# Patient Record
Sex: FEMALE | Race: BLACK OR AFRICAN AMERICAN | NOT HISPANIC OR LATINO | Employment: FULL TIME | URBAN - METROPOLITAN AREA
[De-identification: names, ages, dates, MRNs, and addresses within clinical notes are randomized per-mention and may not be internally consistent; named-entity substitution may affect disease eponyms.]

---

## 2021-11-30 LAB
ATRIAL RATE - MUSE: 105 BPM
DIASTOLIC BLOOD PRESSURE - MUSE: NORMAL MMHG
INTERPRETATION ECG - MUSE: NORMAL
P AXIS - MUSE: 51 DEGREES
PR INTERVAL - MUSE: 146 MS
QRS DURATION - MUSE: 78 MS
QT - MUSE: 352 MS
QTC - MUSE: 465 MS
R AXIS - MUSE: 4 DEGREES
SYSTOLIC BLOOD PRESSURE - MUSE: NORMAL MMHG
T AXIS - MUSE: 12 DEGREES
VENTRICULAR RATE- MUSE: 105 BPM

## 2021-11-30 PROCEDURE — 93005 ELECTROCARDIOGRAM TRACING: CPT | Mod: 76

## 2021-11-30 PROCEDURE — 93005 ELECTROCARDIOGRAM TRACING: CPT

## 2021-11-30 PROCEDURE — 99285 EMERGENCY DEPT VISIT HI MDM: CPT | Mod: 25

## 2021-11-30 ASSESSMENT — MIFFLIN-ST. JEOR: SCORE: 1744.15

## 2021-12-01 ENCOUNTER — APPOINTMENT (OUTPATIENT)
Dept: CT IMAGING | Facility: CLINIC | Age: 36
End: 2021-12-01
Attending: EMERGENCY MEDICINE
Payer: COMMERCIAL

## 2021-12-01 ENCOUNTER — HOSPITAL ENCOUNTER (EMERGENCY)
Facility: CLINIC | Age: 36
Discharge: HOME | End: 2021-12-01
Attending: EMERGENCY MEDICINE | Admitting: EMERGENCY MEDICINE
Payer: COMMERCIAL

## 2021-12-01 ENCOUNTER — HOSPITAL ENCOUNTER (INPATIENT)
Facility: CLINIC | Age: 36
LOS: 7 days | Discharge: HOME | End: 2021-12-08
Attending: EMERGENCY MEDICINE | Admitting: INTERNAL MEDICINE
Payer: COMMERCIAL

## 2021-12-01 VITALS
OXYGEN SATURATION: 98 % | BODY MASS INDEX: 38.32 KG/M2 | TEMPERATURE: 98.3 F | HEART RATE: 101 BPM | WEIGHT: 230 LBS | HEIGHT: 65 IN | SYSTOLIC BLOOD PRESSURE: 115 MMHG | RESPIRATION RATE: 20 BRPM | DIASTOLIC BLOOD PRESSURE: 61 MMHG

## 2021-12-01 DIAGNOSIS — U07.1 INFECTION DUE TO 2019 NOVEL CORONAVIRUS: ICD-10-CM

## 2021-12-01 DIAGNOSIS — J12.82 PNEUMONIA DUE TO 2019 NOVEL CORONAVIRUS: ICD-10-CM

## 2021-12-01 DIAGNOSIS — U07.1 PNEUMONIA DUE TO 2019 NOVEL CORONAVIRUS: ICD-10-CM

## 2021-12-01 DIAGNOSIS — R09.02 HYPOXIA: ICD-10-CM

## 2021-12-01 LAB
ALBUMIN SERPL-MCNC: 2.5 G/DL (ref 3.4–5)
ALBUMIN SERPL-MCNC: 2.6 G/DL (ref 3.4–5)
ALP SERPL-CCNC: 40 U/L (ref 40–150)
ALP SERPL-CCNC: 42 U/L (ref 40–150)
ALT SERPL W P-5'-P-CCNC: 22 U/L (ref 0–50)
ALT SERPL W P-5'-P-CCNC: 25 U/L (ref 0–50)
ANION GAP SERPL CALCULATED.3IONS-SCNC: 5 MMOL/L (ref 3–14)
ANION GAP SERPL CALCULATED.3IONS-SCNC: 6 MMOL/L (ref 3–14)
AST SERPL W P-5'-P-CCNC: 21 U/L (ref 0–45)
AST SERPL W P-5'-P-CCNC: 22 U/L (ref 0–45)
ATRIAL RATE - MUSE: 110 BPM
BASOPHILS # BLD AUTO: 0 10E3/UL (ref 0–0.2)
BASOPHILS # BLD AUTO: 0 10E3/UL (ref 0–0.2)
BASOPHILS NFR BLD AUTO: 0 %
BASOPHILS NFR BLD AUTO: 0 %
BILIRUB SERPL-MCNC: 0.4 MG/DL (ref 0.2–1.3)
BILIRUB SERPL-MCNC: 0.4 MG/DL (ref 0.2–1.3)
BUN SERPL-MCNC: 5 MG/DL (ref 7–30)
BUN SERPL-MCNC: 5 MG/DL (ref 7–30)
CALCIUM SERPL-MCNC: 7.9 MG/DL (ref 8.5–10.1)
CALCIUM SERPL-MCNC: 8 MG/DL (ref 8.5–10.1)
CHLORIDE BLD-SCNC: 101 MMOL/L (ref 94–109)
CHLORIDE BLD-SCNC: 104 MMOL/L (ref 94–109)
CO2 SERPL-SCNC: 25 MMOL/L (ref 20–32)
CO2 SERPL-SCNC: 26 MMOL/L (ref 20–32)
CREAT SERPL-MCNC: 0.72 MG/DL (ref 0.52–1.04)
CREAT SERPL-MCNC: 0.79 MG/DL (ref 0.52–1.04)
D DIMER PPP FEU-MCNC: 0.66 UG/ML FEU (ref 0–0.5)
DIASTOLIC BLOOD PRESSURE - MUSE: NORMAL MMHG
EOSINOPHIL # BLD AUTO: 0 10E3/UL (ref 0–0.7)
EOSINOPHIL # BLD AUTO: 0 10E3/UL (ref 0–0.7)
EOSINOPHIL NFR BLD AUTO: 0 %
EOSINOPHIL NFR BLD AUTO: 0 %
ERYTHROCYTE [DISTWIDTH] IN BLOOD BY AUTOMATED COUNT: 12.2 % (ref 10–15)
ERYTHROCYTE [DISTWIDTH] IN BLOOD BY AUTOMATED COUNT: 12.3 % (ref 10–15)
GFR SERPL CREATININE-BSD FRML MDRD: >90 ML/MIN/1.73M2
GFR SERPL CREATININE-BSD FRML MDRD: >90 ML/MIN/1.73M2
GLUCOSE BLD-MCNC: 115 MG/DL (ref 70–99)
GLUCOSE BLD-MCNC: 117 MG/DL (ref 70–99)
HCT VFR BLD AUTO: 36.3 % (ref 35–47)
HCT VFR BLD AUTO: 37.2 % (ref 35–47)
HGB BLD-MCNC: 11.9 G/DL (ref 11.7–15.7)
HGB BLD-MCNC: 11.9 G/DL (ref 11.7–15.7)
IMM GRANULOCYTES # BLD: 0 10E3/UL
IMM GRANULOCYTES # BLD: 0.1 10E3/UL
IMM GRANULOCYTES NFR BLD: 1 %
IMM GRANULOCYTES NFR BLD: 1 %
INTERPRETATION ECG - MUSE: NORMAL
LACTATE SERPL-SCNC: 1.5 MMOL/L (ref 0.7–2)
LYMPHOCYTES # BLD AUTO: 0.9 10E3/UL (ref 0.8–5.3)
LYMPHOCYTES # BLD AUTO: 1.3 10E3/UL (ref 0.8–5.3)
LYMPHOCYTES NFR BLD AUTO: 10 %
LYMPHOCYTES NFR BLD AUTO: 22 %
MCH RBC QN AUTO: 28.1 PG (ref 26.5–33)
MCH RBC QN AUTO: 28.7 PG (ref 26.5–33)
MCHC RBC AUTO-ENTMCNC: 32 G/DL (ref 31.5–36.5)
MCHC RBC AUTO-ENTMCNC: 32.8 G/DL (ref 31.5–36.5)
MCV RBC AUTO: 88 FL (ref 78–100)
MCV RBC AUTO: 88 FL (ref 78–100)
MONOCYTES # BLD AUTO: 0.3 10E3/UL (ref 0–1.3)
MONOCYTES # BLD AUTO: 0.4 10E3/UL (ref 0–1.3)
MONOCYTES NFR BLD AUTO: 3 %
MONOCYTES NFR BLD AUTO: 6 %
NEUTROPHILS # BLD AUTO: 4.3 10E3/UL (ref 1.6–8.3)
NEUTROPHILS # BLD AUTO: 7.2 10E3/UL (ref 1.6–8.3)
NEUTROPHILS NFR BLD AUTO: 71 %
NEUTROPHILS NFR BLD AUTO: 86 %
NRBC # BLD AUTO: 0 10E3/UL
NRBC # BLD AUTO: 0 10E3/UL
NRBC BLD AUTO-RTO: 0 /100
NRBC BLD AUTO-RTO: 0 /100
P AXIS - MUSE: 56 DEGREES
PLATELET # BLD AUTO: 191 10E3/UL (ref 150–450)
PLATELET # BLD AUTO: 213 10E3/UL (ref 150–450)
POTASSIUM BLD-SCNC: 3.6 MMOL/L (ref 3.4–5.3)
POTASSIUM BLD-SCNC: 4.2 MMOL/L (ref 3.4–5.3)
PR INTERVAL - MUSE: 166 MS
PROT SERPL-MCNC: 7.6 G/DL (ref 6.8–8.8)
PROT SERPL-MCNC: 7.8 G/DL (ref 6.8–8.8)
QRS DURATION - MUSE: 74 MS
QT - MUSE: 314 MS
QTC - MUSE: 424 MS
R AXIS - MUSE: 9 DEGREES
RBC # BLD AUTO: 4.15 10E6/UL (ref 3.8–5.2)
RBC # BLD AUTO: 4.24 10E6/UL (ref 3.8–5.2)
SODIUM SERPL-SCNC: 132 MMOL/L (ref 133–144)
SODIUM SERPL-SCNC: 135 MMOL/L (ref 133–144)
SYSTOLIC BLOOD PRESSURE - MUSE: NORMAL MMHG
T AXIS - MUSE: -6 DEGREES
TROPONIN I SERPL HS-MCNC: 4 NG/L
TROPONIN I SERPL HS-MCNC: 5 NG/L
TROPONIN I SERPL-MCNC: <0.015 UG/L (ref 0–0.04)
VENTRICULAR RATE- MUSE: 110 BPM
WBC # BLD AUTO: 6.1 10E3/UL (ref 4–11)
WBC # BLD AUTO: 8.4 10E3/UL (ref 4–11)

## 2021-12-01 PROCEDURE — 85379 FIBRIN DEGRADATION QUANT: CPT | Performed by: EMERGENCY MEDICINE

## 2021-12-01 PROCEDURE — 85025 COMPLETE CBC W/AUTO DIFF WBC: CPT | Performed by: EMERGENCY MEDICINE

## 2021-12-01 PROCEDURE — 99285 EMERGENCY DEPT VISIT HI MDM: CPT | Mod: 25

## 2021-12-01 PROCEDURE — 96361 HYDRATE IV INFUSION ADD-ON: CPT

## 2021-12-01 PROCEDURE — 36415 COLL VENOUS BLD VENIPUNCTURE: CPT | Performed by: EMERGENCY MEDICINE

## 2021-12-01 PROCEDURE — 99223 1ST HOSP IP/OBS HIGH 75: CPT | Mod: AI | Performed by: INTERNAL MEDICINE

## 2021-12-01 PROCEDURE — 258N000003 HC RX IP 258 OP 636: Performed by: EMERGENCY MEDICINE

## 2021-12-01 PROCEDURE — 250N000011 HC RX IP 250 OP 636: Performed by: EMERGENCY MEDICINE

## 2021-12-01 PROCEDURE — 84155 ASSAY OF PROTEIN SERUM: CPT | Performed by: EMERGENCY MEDICINE

## 2021-12-01 PROCEDURE — 84484 ASSAY OF TROPONIN QUANT: CPT | Performed by: EMERGENCY MEDICINE

## 2021-12-01 PROCEDURE — XW033E5 INTRODUCTION OF REMDESIVIR ANTI-INFECTIVE INTO PERIPHERAL VEIN, PERCUTANEOUS APPROACH, NEW TECHNOLOGY GROUP 5: ICD-10-PCS | Performed by: INTERNAL MEDICINE

## 2021-12-01 PROCEDURE — 83605 ASSAY OF LACTIC ACID: CPT | Performed by: EMERGENCY MEDICINE

## 2021-12-01 PROCEDURE — 250N000009 HC RX 250: Performed by: EMERGENCY MEDICINE

## 2021-12-01 PROCEDURE — 120N000001 HC R&B MED SURG/OB

## 2021-12-01 PROCEDURE — 96375 TX/PRO/DX INJ NEW DRUG ADDON: CPT

## 2021-12-01 PROCEDURE — 250N000011 HC RX IP 250 OP 636: Performed by: INTERNAL MEDICINE

## 2021-12-01 PROCEDURE — 96374 THER/PROPH/DIAG INJ IV PUSH: CPT | Mod: 59

## 2021-12-01 PROCEDURE — 250N000013 HC RX MED GY IP 250 OP 250 PS 637: Performed by: EMERGENCY MEDICINE

## 2021-12-01 PROCEDURE — 94640 AIRWAY INHALATION TREATMENT: CPT

## 2021-12-01 PROCEDURE — 71275 CT ANGIOGRAPHY CHEST: CPT

## 2021-12-01 PROCEDURE — 96374 THER/PROPH/DIAG INJ IV PUSH: CPT

## 2021-12-01 PROCEDURE — 82040 ASSAY OF SERUM ALBUMIN: CPT | Performed by: EMERGENCY MEDICINE

## 2021-12-01 RX ORDER — ALBUTEROL SULFATE 90 UG/1
2 AEROSOL, METERED RESPIRATORY (INHALATION) EVERY 6 HOURS PRN
Qty: 6.7 G | Refills: 0 | Status: SHIPPED | OUTPATIENT
Start: 2021-12-01

## 2021-12-01 RX ORDER — ONDANSETRON 2 MG/ML
4 INJECTION INTRAMUSCULAR; INTRAVENOUS EVERY 6 HOURS PRN
Status: DISCONTINUED | OUTPATIENT
Start: 2021-12-01 | End: 2021-12-08 | Stop reason: HOSPADM

## 2021-12-01 RX ORDER — ACETAMINOPHEN 500 MG
1000 TABLET ORAL ONCE
Status: COMPLETED | OUTPATIENT
Start: 2021-12-01 | End: 2021-12-01

## 2021-12-01 RX ORDER — DOXYCYCLINE 100 MG/1
100 CAPSULE ORAL 2 TIMES DAILY
Qty: 14 CAPSULE | Refills: 0 | Status: ON HOLD | OUTPATIENT
Start: 2021-12-01 | End: 2021-12-08

## 2021-12-01 RX ORDER — ONDANSETRON 2 MG/ML
4 INJECTION INTRAMUSCULAR; INTRAVENOUS EVERY 30 MIN PRN
Status: DISCONTINUED | OUTPATIENT
Start: 2021-12-01 | End: 2021-12-01 | Stop reason: HOSPADM

## 2021-12-01 RX ORDER — DEXAMETHASONE SODIUM PHOSPHATE 4 MG/ML
6 INJECTION, SOLUTION INTRA-ARTICULAR; INTRALESIONAL; INTRAMUSCULAR; INTRAVENOUS; SOFT TISSUE ONCE
Status: COMPLETED | OUTPATIENT
Start: 2021-12-01 | End: 2021-12-01

## 2021-12-01 RX ORDER — IBUPROFEN 600 MG/1
600 TABLET, FILM COATED ORAL ONCE
Status: COMPLETED | OUTPATIENT
Start: 2021-12-01 | End: 2021-12-01

## 2021-12-01 RX ORDER — ONDANSETRON 4 MG/1
4 TABLET, ORALLY DISINTEGRATING ORAL EVERY 6 HOURS PRN
Status: DISCONTINUED | OUTPATIENT
Start: 2021-12-01 | End: 2021-12-08 | Stop reason: HOSPADM

## 2021-12-01 RX ORDER — ACETAMINOPHEN 500 MG
1000 TABLET ORAL ONCE
Status: DISCONTINUED | OUTPATIENT
Start: 2021-12-01 | End: 2021-12-01

## 2021-12-01 RX ORDER — ALBUTEROL SULFATE 90 UG/1
2 AEROSOL, METERED RESPIRATORY (INHALATION) ONCE
Status: COMPLETED | OUTPATIENT
Start: 2021-12-01 | End: 2021-12-01

## 2021-12-01 RX ORDER — IOPAMIDOL 755 MG/ML
79 INJECTION, SOLUTION INTRAVASCULAR ONCE
Status: COMPLETED | OUTPATIENT
Start: 2021-12-01 | End: 2021-12-01

## 2021-12-01 RX ADMIN — IBUPROFEN 600 MG: 600 TABLET ORAL at 18:24

## 2021-12-01 RX ADMIN — IBUPROFEN 600 MG: 600 TABLET ORAL at 02:16

## 2021-12-01 RX ADMIN — ACETAMINOPHEN 1000 MG: 500 TABLET, FILM COATED ORAL at 02:16

## 2021-12-01 RX ADMIN — IOPAMIDOL 79 ML: 755 INJECTION, SOLUTION INTRAVENOUS at 03:23

## 2021-12-01 RX ADMIN — SODIUM CHLORIDE 1000 ML: 9 INJECTION, SOLUTION INTRAVENOUS at 02:59

## 2021-12-01 RX ADMIN — SODIUM CHLORIDE 100 ML: 900 INJECTION INTRAVENOUS at 03:24

## 2021-12-01 RX ADMIN — ONDANSETRON 4 MG: 2 INJECTION INTRAMUSCULAR; INTRAVENOUS at 02:16

## 2021-12-01 RX ADMIN — SODIUM CHLORIDE 1000 ML: 9 INJECTION, SOLUTION INTRAVENOUS at 02:16

## 2021-12-01 RX ADMIN — ALBUTEROL SULFATE 2 PUFF: 90 AEROSOL, METERED RESPIRATORY (INHALATION) at 05:20

## 2021-12-01 RX ADMIN — ONDANSETRON 4 MG: 2 INJECTION INTRAMUSCULAR; INTRAVENOUS at 19:29

## 2021-12-01 RX ADMIN — DEXAMETHASONE SODIUM PHOSPHATE 6 MG: 4 INJECTION, SOLUTION INTRAMUSCULAR; INTRAVENOUS at 19:19

## 2021-12-01 ASSESSMENT — ACTIVITIES OF DAILY LIVING (ADL)
ADLS_ACUITY_SCORE: 7

## 2021-12-01 ASSESSMENT — ENCOUNTER SYMPTOMS
COUGH: 1
NAUSEA: 1
HEADACHES: 1
EYE PAIN: 1
VOMITING: 1
CHEST TIGHTNESS: 1
SHORTNESS OF BREATH: 1

## 2021-12-01 NOTE — DISCHARGE INSTRUCTIONS
Return if your oxygen saturation drops below 90% and persist there and does not come up with resting.    Discharge Instructions  COVID-19    COVID-19 is the disease caused by a new coronavirus. The virus spreads from person-to-person primarily by droplets when an infected person coughs or sneezes and the droplets are then breathed in by another person. There are tests available to diagnose COVID-19. You may have been diagnosed with COVID, may be being tested for COVID and have a pending test result, or may have been exposed to COVID.    Symptoms of COVID-19  Many people have no symptoms or mild symptoms.  Symptoms may usually appear 4 to 5 days (up to 14 days) after contact with a person with COVID-19. Some people will get severe symptoms and pneumonia. Usual symptoms are:     ? Fever  ? Cough  ? Trouble breathing    Less common symptoms are: Headache, body aches, sore throat, sneezing, diarrhea, loss of taste or smell.    Isolation and Quarantine    You may have been seen because you have symptoms, had an exposure, or had some other concern about possible COVID. The best way to stop the spread of the virus is to avoid contact with others.    Isolation refers to sick people staying away from people who are not sick. A person in quarantine is limiting activity because they were exposed and are waiting to see if they might become sick.    If you test positive for COVID, you should stay home (isolation) for at least 10 days after your symptoms began, and for 24 hours with no fever and improvement of symptoms--whichever is longer. (Your fever should be gone for 24 hours without using fever-reducing medicine). If you have no symptoms, you should stay home (isolation) for 10 days from the day of the test. If you have been vaccinated for COVID, the vaccination will not cause you to test positive so a positive test result generally is a  true positive .    For example, if you have a fever and cough for 6 days, you need to  stay home 4 more days with no fever for a total of 10 days. Or, if you have a fever and cough for 10 days, you need to stay home one more day with no fever for a total of 11 days.    If you have a high-risk exposure to COVID (you spent 15 minutes or more within six feet of somebody who has COVID), you should stay home (quarantine) for 14 days, unless you are vaccinated. Even if you test negative for COVID, the CDC recommends a 14-day quarantine from the time of your last exposure to that individual (unless you are vaccinated). There are options for a shortened (<14 day quarantine) you can review at:  [email or web address]#long    If you live in the same house as somebody with COVID and cannot separate from them, you will need to quarantine for 14-days after that person's isolation (infectious) period. That means that you may need to quarantine for 24-days after that person became symptomatic/ill.    If you are vaccinated and do not develop symptoms, you do not need to quarantine after exposure.    If you have symptoms but a negative test, you should stay at home until you are symptom-free and without fever for 24 hours, using the same judgment you would for when it is safe to return to work/school from strep throat, influenza, or the common cold. If you worsen, you should consider being re-evaluated.    If you are being tested for COVID because of symptoms and your test is pending, you should stay home until you know your test result.    If I have COVID, how should I protect myself and others?    Do not go to work or school. Have a friend or relative do your shopping. Do not use public transportation (bus, train) or ridesharing (Lyft, Uber).    Separate yourself from other people in your home. As much as possible, you should stay in one room and away from other people in your home. Also, use a separate bathroom, if possible. Avoid handling pets or other animals while sick.      Wear a facemask if you need to be around other people and cover your mouth and nose with a tissue when you cough or sneeze.     Avoid sharing personal household items. You should not share dishes, drinking glasses, forks/knives/spoons, towels, or bedding with other people in your home. After using these items, they should be washed with soap and water. Clean parts of your home that are touched often (doorknobs, faucets, countertops, etc.) daily.     Wash your hands often with soap and water for at least 20 seconds or use an alcohol-based hand  containing at least 60% alcohol.     Avoid touching your face.    Treat your symptoms. You can take Acetaminophen (Tylenol) to treat body aches and fever as needed for comfort. Ibuprofen (Advil or Motrin) can be used as well if you still have symptoms after taking Tylenol. Drink fluids. Rest.    Watch for worsening symptoms such as shortness of breath/difficulty breathing or very severe weakness.    Employers/workplaces are being asked by the Centers for Disease Control (CDC) to not request notes/documentation for you to return to work or prove that you were ill. You may choose to show your employer this paperwork. Also, repeat testing should not be required to return to work.    Exercise/Sports in rare cases, COVID could affect your heart in a way that makes exercise or participation in sports dangerous.    If you have a mild COVID illness (fever, cough, sore throat, and similar symptoms but no difficulty breathing or abnormalities of the lung): After your COVID symptoms have resolved, wait 14-days before returning to activity.  If you have more than a mild illness (meaning that you have problems with your breathing or lungs) or if you participate in competitive or strenuous activity or have a history of heart disease: Please see your primary doctor/provider prior to return to activity/competition.    Antibody treatments are available for patients with mild to  moderate COVID illness in order to prevent severe illness. In general, only patients with risk factors for severe illness are eligible for treatment. For more information, to see if you are eligible, and to find treatment, go to the [facility]:  [email or web address]     Return to the Emergency Department if:    If you are developing worsening breathing, shortness of breath, or feel worse you should seek medical attention.  If you are uncertain, contact your health care provider/clinic. If you need emergency medical attention, call 911 and tell them you have been ill.

## 2021-12-01 NOTE — ED PROVIDER NOTES
"  History   Chief Complaint:  Shortness of Breath and Covid Concern       The history is provided by the patient.      Kim Guillory is a 36 year old female who presents with headache, burning eye pain bilaterally, chest tightness and cough, loss of taste and smell, nausea, vomiting, diarrhea.  Symptoms started with a mild cough last Thursday (5 days ago).  Since then she has developed fevers and the other symptoms consistent with COVID-19.  She tested positive for COVID-19 on Saturday.  Patient did not receive COVID-19 vaccination.  She denies sick contacts.  Patient states she has been unable to eat due to nausea and persistent vomiting.  Her last Tylenol dose was at 2 PM    Review of Systems  Constitutional: Fevers and chills  Neurologic: Headache, no weakness or numbness  GI: Nausea, vomiting, diarrhea  Respiratory: Cough and shortness of breath  Cardiovascular: Chest pain  Remainder of systems reviewed and negative    Allergies:  No Known Allergies    Medications:  Mirena IUD    Past Medical History:     Vitamin D deficiency     Past Surgical History:     section    Family History:    Uterine cancer  Prostate cancer    Social History:  Presents with family  PCP: No Ref-Primary, Physician     Physical Exam     Patient Vitals for the past 24 hrs:   BP Temp Temp src Pulse Resp SpO2 Height Weight   21 0300 104/48 -- -- 99 -- 96 % -- --   21 0230 114/67 -- -- 101 -- 95 % -- --   21 0038 -- (!) 100.5  F (38.1  C) Oral -- -- -- -- --   21 0030 127/82 -- -- -- -- -- -- --   21 1800 (!) 151/89 99.2  F (37.3  C) -- 110 24 99 % 1.651 m (5' 5\") 104.3 kg (230 lb)       Physical Exam  General: Patient is alert and unwell appearing.  HEENT: Head atraumatic    Eyes: pupils equal and reactive. Conjunctiva clear   Nares: patent   Oropharynx: no lesions, uvula midline, no palatal draping, normal voice, no trismus  Neck: Supple without lymphadenopathy, no meningismus  Chest: Tachycardic but " regular  Lungs: Equal clear to auscultation with no wheeze or rales  Abdomen: Soft, non tender, nondistended, normal bowel sounds  Back: No costovertebral angle tenderness, no midline C, T or L spine tenderness  Neuro: Grossly nonfocal, normal speech, strength equal bilaterally, CN 2-12 intact  Extremities: No deformities, equal radial and DP pulses. No clubbing, cyanosis.  No edema  Skin: Warm and dry with no rash.       Emergency Department Course   ECG  ECG obtained at 0041, ECG read at 0044  Sinus tachycardia. Nonspecific T wave abnormality. Abnormal ECG.    No prior ECG for comparison.   Rate 110 bpm. WI interval 166 ms. QRS duration 74 ms. QT/QTc 314/424 ms. P-R-T axes 56 9 -6.     Imaging:  CT Chest Pulmonary Embolism w Contrast   Final Result   IMPRESSION:   1.  Multifocal, bilateral infiltrates which can be seen with COVID pneumonia.   2.  No pulmonary embolism.        Report per radiology    Laboratory:  Labs Ordered and Resulted from Time of ED Arrival to Time of ED Departure   COMPREHENSIVE METABOLIC PANEL - Abnormal       Result Value    Sodium 132 (*)     Potassium 4.2      Chloride 101      Carbon Dioxide (CO2) 26      Anion Gap 5      Urea Nitrogen 5 (*)     Creatinine 0.79      Calcium 8.0 (*)     Glucose 117 (*)     Alkaline Phosphatase 42      AST 22      ALT 25      Protein Total 7.8      Albumin 2.6 (*)     Bilirubin Total 0.4      GFR Estimate >90     D DIMER QUANTITATIVE - Abnormal    D-Dimer Quantitative 0.66 (*)    LACTIC ACID WHOLE BLOOD - Normal    Lactic Acid 1.5     TROPONIN I - Normal    Troponin I High Sensitivity 5     CBC WITH PLATELETS AND DIFFERENTIAL    WBC Count 6.1      RBC Count 4.15      Hemoglobin 11.9      Hematocrit 36.3      MCV 88      MCH 28.7      MCHC 32.8      RDW 12.2      Platelet Count 191      % Neutrophils 71      % Lymphocytes 22      % Monocytes 6      % Eosinophils 0      % Basophils 0      % Immature Granulocytes 1      NRBCs per 100 WBC 0      Absolute  Neutrophils 4.3      Absolute Lymphocytes 1.3      Absolute Monocytes 0.4      Absolute Eosinophils 0.0      Absolute Basophils 0.0      Absolute Immature Granulocytes 0.0      Absolute NRBCs 0.0     TROPONIN I        Emergency Department Course:  Reviewed:  I reviewed nursing notes, vitals, past medical history, Care Everywhere and [facility]    Assessments:  0035 I obtained history and examined the patient as noted above.   0440 I rechecked the patient and explained findings.     Interventions:  0216 0.9% sodium chloride bolus, 1,000 ml, IV  0216 Tylenol, 1,000 mg, PO  0216 Ibuprofen, 600 mg, PO  0216 Zofran, 4 mg, IV  0259 0.9% sodium chloride bolus, 1,000 ml, IV    Disposition:  The patient was discharged to home.     Impression & Plan     Medical Decision Making:  Patient is a 36-year-old female with history of headache, chest tightness, cough, loss of taste and smell as well as nausea, vomiting, diarrhea consistent with COVID-19 infection.  Work-up was undertaken as noted above.  Thankfully no evidence for pulmonary embolism or myocarditis.  There is evidence for pneumonia.  Patient's oxygen saturation and ambulatory sats stays above 94%.  Patient feels improved after fluid hydration here in the emergency department.  She will be given an albuterol inhaler for home.  In addition pulse oximeter will be given for home as well.  This is likely viral pneumonia from COVID-19 but possibility for bacterial superinfection exists with her continued fevers.  She will be given a prescription for doxycycline in the event that there is bacterial superinfection however she understands this is likely viral and this will not improve her symptoms significantly.  Recommend continued fluid hydration at home.  Tylenol and Motrin for the her fever.  Return precautions related to her respiratory status and oxygen saturation were discussed.  Patient understands need to continue to isolate.  At this time patient is appropriate and  clinically appropriate for discharge.  She understands she may get worse before she gets better at which point she will need to return to the emergency department.  Patient's questions and concerns were addressed.      Covid-19  Kim Guillory was evaluated during a global COVID-19 pandemic, which necessitated consideration that the patient might be at risk for infection with the SARS-CoV-2 virus that causes COVID-19.   Applicable protocols for evaluation were followed during the patient's care.   COVID-19 was considered as part of the patient's evaluation. The plan for testing is:  a test was obtained at a previous visit and reviewed & considered today.      Diagnosis:    ICD-10-CM    1. Pneumonia due to 2019 novel coronavirus  U07.1     J12.82        Discharge Medications:  New Prescriptions    ALBUTEROL (PROAIR HFA/PROVENTIL HFA/VENTOLIN HFA) 108 (90 BASE) MCG/ACT INHALER    Inhale 2 puffs into the lungs every 6 hours as needed for shortness of breath / dyspnea or wheezing    DOXYCYCLINE HYCLATE (VIBRAMYCIN) 100 MG CAPSULE    Take 1 capsule (100 mg) by mouth 2 times daily for 7 days       Scribe Disclosure:  Maryanne ELAINE, am serving as a scribe at 12:10 AM on 12/1/2021 to document services personally performed by Vane Weiss MD based on my observations and the provider's statements to me.              Vane Weiss MD  12/01/21 0507

## 2021-12-01 NOTE — ED NOTES
md in to f/u. Pt dcd with rx, teaching, and instructions. Pt dcd with portable finger sat monitor, albuterol inhaler and spacer. All questions answered.

## 2021-12-02 LAB
ABO/RH(D): NORMAL
ANION GAP SERPL CALCULATED.3IONS-SCNC: 4 MMOL/L (ref 3–14)
BUN SERPL-MCNC: 6 MG/DL (ref 7–30)
CALCIUM SERPL-MCNC: 8.6 MG/DL (ref 8.5–10.1)
CHLORIDE BLD-SCNC: 107 MMOL/L (ref 94–109)
CO2 SERPL-SCNC: 28 MMOL/L (ref 20–32)
CREAT SERPL-MCNC: 0.69 MG/DL (ref 0.52–1.04)
CRP SERPL-MCNC: 212 MG/L (ref 0–8)
D DIMER PPP FEU-MCNC: 0.71 UG/ML FEU (ref 0–0.5)
ERYTHROCYTE [DISTWIDTH] IN BLOOD BY AUTOMATED COUNT: 12.4 % (ref 10–15)
FIBRINOGEN PPP-MCNC: 802 MG/DL (ref 170–490)
GFR SERPL CREATININE-BSD FRML MDRD: >90 ML/MIN/1.73M2
GLUCOSE BLD-MCNC: 170 MG/DL (ref 70–99)
HCT VFR BLD AUTO: 36.7 % (ref 35–47)
HGB BLD-MCNC: 11.9 G/DL (ref 11.7–15.7)
LDH SERPL L TO P-CCNC: 342 U/L (ref 81–234)
MCH RBC QN AUTO: 28.3 PG (ref 26.5–33)
MCHC RBC AUTO-ENTMCNC: 32.4 G/DL (ref 31.5–36.5)
MCV RBC AUTO: 87 FL (ref 78–100)
PLATELET # BLD AUTO: 215 10E3/UL (ref 150–450)
POTASSIUM BLD-SCNC: 4.2 MMOL/L (ref 3.4–5.3)
RBC # BLD AUTO: 4.2 10E6/UL (ref 3.8–5.2)
SODIUM SERPL-SCNC: 139 MMOL/L (ref 133–144)
SPECIMEN EXPIRATION DATE: NORMAL
WBC # BLD AUTO: 4.9 10E3/UL (ref 4–11)

## 2021-12-02 PROCEDURE — 120N000001 HC R&B MED SURG/OB

## 2021-12-02 PROCEDURE — 85384 FIBRINOGEN ACTIVITY: CPT | Performed by: INTERNAL MEDICINE

## 2021-12-02 PROCEDURE — 250N000013 HC RX MED GY IP 250 OP 250 PS 637: Performed by: STUDENT IN AN ORGANIZED HEALTH CARE EDUCATION/TRAINING PROGRAM

## 2021-12-02 PROCEDURE — 258N000003 HC RX IP 258 OP 636: Performed by: INTERNAL MEDICINE

## 2021-12-02 PROCEDURE — 99233 SBSQ HOSP IP/OBS HIGH 50: CPT | Performed by: STUDENT IN AN ORGANIZED HEALTH CARE EDUCATION/TRAINING PROGRAM

## 2021-12-02 PROCEDURE — 86900 BLOOD TYPING SEROLOGIC ABO: CPT | Performed by: INTERNAL MEDICINE

## 2021-12-02 PROCEDURE — 36415 COLL VENOUS BLD VENIPUNCTURE: CPT | Performed by: INTERNAL MEDICINE

## 2021-12-02 PROCEDURE — 250N000013 HC RX MED GY IP 250 OP 250 PS 637: Performed by: INTERNAL MEDICINE

## 2021-12-02 PROCEDURE — 85379 FIBRIN DEGRADATION QUANT: CPT | Performed by: INTERNAL MEDICINE

## 2021-12-02 PROCEDURE — 86140 C-REACTIVE PROTEIN: CPT | Performed by: INTERNAL MEDICINE

## 2021-12-02 PROCEDURE — 250N000012 HC RX MED GY IP 250 OP 636 PS 637: Performed by: INTERNAL MEDICINE

## 2021-12-02 PROCEDURE — 250N000011 HC RX IP 250 OP 636: Performed by: INTERNAL MEDICINE

## 2021-12-02 PROCEDURE — 83615 LACTATE (LD) (LDH) ENZYME: CPT | Performed by: INTERNAL MEDICINE

## 2021-12-02 PROCEDURE — 85027 COMPLETE CBC AUTOMATED: CPT | Performed by: INTERNAL MEDICINE

## 2021-12-02 PROCEDURE — 250N000009 HC RX 250: Performed by: INTERNAL MEDICINE

## 2021-12-02 PROCEDURE — 80048 BASIC METABOLIC PNL TOTAL CA: CPT | Performed by: INTERNAL MEDICINE

## 2021-12-02 RX ORDER — ACETAMINOPHEN 325 MG/1
650 TABLET ORAL EVERY 6 HOURS PRN
Status: DISCONTINUED | OUTPATIENT
Start: 2021-12-02 | End: 2021-12-08 | Stop reason: HOSPADM

## 2021-12-02 RX ORDER — LIDOCAINE 40 MG/G
CREAM TOPICAL
Status: DISCONTINUED | OUTPATIENT
Start: 2021-12-02 | End: 2021-12-08 | Stop reason: HOSPADM

## 2021-12-02 RX ORDER — ACETAMINOPHEN 650 MG/1
650 SUPPOSITORY RECTAL EVERY 6 HOURS PRN
Status: DISCONTINUED | OUTPATIENT
Start: 2021-12-02 | End: 2021-12-08 | Stop reason: HOSPADM

## 2021-12-02 RX ORDER — ALBUTEROL SULFATE 90 UG/1
2 AEROSOL, METERED RESPIRATORY (INHALATION) EVERY 4 HOURS PRN
Status: DISCONTINUED | OUTPATIENT
Start: 2021-12-02 | End: 2021-12-08 | Stop reason: HOSPADM

## 2021-12-02 RX ORDER — AMOXICILLIN 250 MG
2 CAPSULE ORAL 2 TIMES DAILY PRN
Status: DISCONTINUED | OUTPATIENT
Start: 2021-12-02 | End: 2021-12-08 | Stop reason: HOSPADM

## 2021-12-02 RX ORDER — AMOXICILLIN 250 MG
1 CAPSULE ORAL 2 TIMES DAILY PRN
Status: DISCONTINUED | OUTPATIENT
Start: 2021-12-02 | End: 2021-12-08 | Stop reason: HOSPADM

## 2021-12-02 RX ADMIN — ENOXAPARIN SODIUM 40 MG: 40 INJECTION SUBCUTANEOUS at 21:12

## 2021-12-02 RX ADMIN — REMDESIVIR 100 MG: 100 INJECTION, POWDER, LYOPHILIZED, FOR SOLUTION INTRAVENOUS at 20:59

## 2021-12-02 RX ADMIN — GUAIFENESIN 10 ML: 200 SOLUTION ORAL at 16:43

## 2021-12-02 RX ADMIN — SODIUM CHLORIDE 50 ML: 9 INJECTION, SOLUTION INTRAVENOUS at 22:10

## 2021-12-02 RX ADMIN — ENOXAPARIN SODIUM 40 MG: 40 INJECTION SUBCUTANEOUS at 08:51

## 2021-12-02 RX ADMIN — Medication 1 LOZENGE: at 16:43

## 2021-12-02 RX ADMIN — DEXAMETHASONE 6 MG: 2 TABLET ORAL at 13:48

## 2021-12-02 RX ADMIN — REMDESIVIR 200 MG: 100 INJECTION, POWDER, LYOPHILIZED, FOR SOLUTION INTRAVENOUS at 02:25

## 2021-12-02 RX ADMIN — ACETAMINOPHEN 650 MG: 325 TABLET, FILM COATED ORAL at 13:49

## 2021-12-02 RX ADMIN — SODIUM CHLORIDE 50 ML: 9 INJECTION, SOLUTION INTRAVENOUS at 02:34

## 2021-12-02 ASSESSMENT — ACTIVITIES OF DAILY LIVING (ADL)
ADLS_ACUITY_SCORE: 9
ADLS_ACUITY_SCORE: 7
ADLS_ACUITY_SCORE: 9
ADLS_ACUITY_SCORE: 7
ADLS_ACUITY_SCORE: 9

## 2021-12-02 ASSESSMENT — MIFFLIN-ST. JEOR: SCORE: 1830.88

## 2021-12-02 NOTE — PLAN OF CARE
Summary:  COVID+   DATE & TIME: 12/02/21 [id number]  Cognitive Concerns/ Orientation : A&O x4   BEHAVIOR & AGGRESSION TOOL COLOR: Green  CIWA SCORE: NA   ABNL VS/O2: VSS 1 L NC  MOBILITY: IND, calls if needs assistance  PAIN MANAGMENT: Denies  DIET: Regular  BOWEL/BLADDER: Continent to BR  ABNL LAB/BG: , Alb 2.5, d-dimer 0.66  DRAIN/DEVICES: PIV R arm saline locked  TELEMETRY RHYTHM: NA  SKIN: WNL  TESTS/PROCEDURES: None  D/C DAY/GOALS/PLACE: Discharge pending clinical improvement  OTHER IMPORTANT INFO: Special precautions maintained for COVID +; QUILES; frequent productive cough, LS diminished.  Started on Remdesivir and Decadron.

## 2021-12-02 NOTE — H&P
[facility]    History and Physical - Hospitalist Service       Date of Admission:  12/1/2021    Assessment & Plan      Kim Guillory is a 36 year old unvaccinated female with no other significant past medical history admitted on 12/1/2021 with COVID-19 pneumonia    COVID-19 Diagnosis Details:  Onset of COVID symptoms 5 days ago   Date of positive test results 12/1   Research study No     # Confirmed COVID-19 infection    # Acute Hypoxic Respiratory Failure secondary to COVID-19 infection  - Initial chest CT without contrast showed multifocal airspace disease. Oxygen needs have been slightly worsened.   - Admit to medical floor with continuous pulse oximetry, COVID-19 special precautions, minimized lab draws, and care consolidation  - Oxygen: continue current support with nasal cannula at 2 L/min; titrate to keep SpO2 between 90-96%  - Labs: labs notable for Sodium 132, albumin 2.6, D-dimer 0.6 done on admission and reviewed by me. Will trend until patient reaches clinical stability.  - Imaging: Had chest CT to rule out PE last night  - Breathing treatments: albuterol inhaler four times a day scheduled and PRN; avoid nebulizers in favor of MDIs   - IV fluids: not indicated at this time  - Antibiotics: not indicated   - Treatments: Dexamethasone 6 mg x 10 days or until hospital discharge, started on 12/1 and Remdesivir x 5 days or until hospital discharge, started on 12/1  - Research study protocol: Now  - DVT Prophylaxis: At high risk of thrombotic complications due to COVID-19 disease         - PROPHYLACTIC dosing: lovenox 40mg daily  - Discharge Anticoagulation: At discharge consider one of the following for 30 days and until the patient has returned to normal mobility:        - Apixaban (Eliquis) 2.5 mg two times a day        - Rivaroxaban (Xarelto) 10 mg once daily    Diet:  Regular  DVT Prophylaxis: Enoxaparin (Lovenox) SQ  Rivera Catheter: Not present  Central Lines: None  Code  Status:  Full    Clinically Significant Risk Factors Present on Admission         # Hyponatremia: Na = 132 mmol/L (Ref range: 133 - 144 mmol/L) on admission, will monitor as appropriate           Disposition Plan   Expected discharge:  2-3 days recommended to prior living arrangement once O2 use less than 0 liters/minute.     The patient's care was discussed with the Bedside Nurse, Patient and ED Team.    Elmo Horn MD, MD  [facility]  Securely message with the Vocera Web Console (learn more here)  Text page via Organizer Paging/Directory    ______________________________________________________________________    Chief Complaint   Worsening shortness of breath    History is obtained from the patient and the ED staff    History of Present Illness   Kim Guillory is a 36 year old unvaccinated female with no other significant past medical history admitted on 12/1/2021 with COVID-19 pneumonia  As per her she started having headache, burning eye pain with cough, loss of taste and smell, nausea, vomiting, diarrhea around last Thursday.  Had positive test on Saturday and has had fevers.  Last night she presented to the ED due to inability to eat and fevers.  She had CT chest to rule out PE (no PE but groundglass opacities consistent with COVID-19 pneumonia) .  She was discharged home with albuterol and doxycycline  She presented again in the afternoon with worsening shortness of breath and desaturating with minimal exertion.     She continues to have nausea, vomiting, diarrhea.  Had 5 episodes of this morning.  Worsening shortness of breath  Denies any chest pain/palpitations    Review of Systems    The 10 point Review of Systems is negative other than noted in the HPI or here.     Past Medical History    I have reviewed this patient's medical history and updated it with pertinent information if needed.   No past medical history on file.    Past Surgical History   I have reviewed this  patient's surgical history and updated it with pertinent information if needed.  No past surgical history on file.    Social History   I have reviewed this patient's social history and updated it with pertinent information if needed.  Social History     Tobacco Use     Smoking status: Not on file     Smokeless tobacco: Not on file   Substance Use Topics     Alcohol use: Not on file     Drug use: Not on file       Family History     Reviewed not pertinent to current admission: Multiple members of breast cancer    Prior to Admission Medications   Prior to Admission Medications   Prescriptions Last Dose Informant Patient Reported? Taking?   albuterol (PROAIR HFA/PROVENTIL HFA/VENTOLIN HFA) 108 (90 Base) MCG/ACT inhaler   No No   Sig: Inhale 2 puffs into the lungs every 6 hours as needed for shortness of breath / dyspnea or wheezing   doxycycline hyclate (VIBRAMYCIN) 100 MG capsule   No No   Sig: Take 1 capsule (100 mg) by mouth 2 times daily for 7 days      Facility-Administered Medications: None     Allergies   No Known Allergies    Physical Exam   Vital Signs: Temp: 100.4  F (38  C)   BP: (!) 142/87 Pulse: 114   Resp: (!) 42 SpO2: 95 % O2 Device: Nasal cannula    Weight: 230 lbs 0 oz    /78   Pulse 116   Temp (!) 102.9  F (39.4  C) (Oral)   Resp (!) 36   Wt 104.3 kg (230 lb)   SpO2 98%   BMI 38.27 kg/m    Gen- pleasant  lying in bed  HEENT- NAD, ANNE  Neck- supple, no JVD elevation, no thyromegaly  CVS- I+II+ no m/r/g  RS- CTAB, no gross wheeze.  Abdo- soft, no tenderness . No g/r/r   Ext- no edema   CNS- no gross focal deficit    Data   Data reviewed today: I reviewed all medications, new labs and imaging results over the last 24 hours. I personally reviewed the chest CT image(s) showing as below.    8.4    \    11.9    /    213   N 86    L N/A    132 (L)    101    5 (L) /   ------------------------------------ 117 (H)   ALT 25   AST 22   AP 42   ALB 2.6 (L)   Ca 8.0 (L)  4.2    26    0.79 \    %  RETIC N/A    LDH N/A  Troponin <0.015    BNP N/A    CK N/A  INR N/A   PTT N/A    D-dimer 0.66 (H)    Fibrinogen N/A    Antithrombin N/A  Ferritin N/A  CRP N/A    IL-6 N/A  Recent Results (from the past 24 hour(s))   CT Chest Pulmonary Embolism w Contrast    Narrative    EXAM: CT CHEST PULMONARY EMBOLISM W CONTRAST  LOCATION: [facility]  DATE/TIME: 12/1/2021 3:35 AM    INDICATION: PE suspected, low/intermediate prob, positive D-dimer. COVID 19.  COMPARISON: None.  TECHNIQUE: CT chest pulmonary angiogram during arterial phase injection of IV contrast. Multiplanar reformats and MIP reconstructions were performed. Dose reduction techniques were used.   CONTRAST: 79mL [id number]-370    FINDINGS:  ANGIOGRAM CHEST: Pulmonary arteries are normal caliber and negative for pulmonary emboli. Thoracic aorta is negative for dissection. No CT evidence of right heart strain.    LUNGS AND PLEURA: Multifocal, bilateral infiltrates with subpleural predominance.    MEDIASTINUM/AXILLAE: Subcentimeter mediastinal lymph nodes. No pericardial effusion. Moderate hiatal hernia.    CORONARY ARTERY CALCIFICATION: None visualized.    UPPER ABDOMEN: Normal.    MUSCULOSKELETAL: Normal.      Impression    IMPRESSION:  1.  Multifocal, bilateral infiltrates which can be seen with COVID pneumonia.  2.  No pulmonary embolism.

## 2021-12-02 NOTE — ED NOTES
RN not releasing pt for CXR due to pt having a chest CT performed at 0330. Dr. Hernandez states CXR does not need to be performed, however, we are unable to cancel it

## 2021-12-02 NOTE — PLAN OF CARE
RECEIVING UNIT ED HANDOFF REVIEW    ED Nurse Handoff Report was reviewed by: Delmi Arnold RN on December 1, 2021 at 11:29 PM

## 2021-12-02 NOTE — ED NOTES
Received report for continuation of care.  Patient resting, readjusted in bed. Remains tachypneic, is on 2L NC. Endorses nausea, medicated per MAR. Provided with a meal as well.  Is febrile, but not due for antipyretics yet.

## 2021-12-02 NOTE — ED PROVIDER NOTES
History   Chief Complaint:  Dizziness and Shortness of Breath     The history is provided by the patient.      Kim Guillory is a 36 year old female who presents with dizziness and shortness of breath. The patient tells us that she was seen here earlier this morning for headache, bilateral eye burning, chest tightness, cough, loss of taste and smell, nausea and vomiting. She was diagnosed with COVID, given medication and sent home with albuterol and doxycycline. The patient returns to the ED after having increased symptoms, specifically shortness of breath.     To note, the patients last dose of Tylenol was at 1500.     Review of Systems   HENT: Positive for congestion.    Eyes: Positive for pain.   Respiratory: Positive for cough, chest tightness and shortness of breath.    Cardiovascular: Positive for chest pain.   Gastrointestinal: Positive for nausea and vomiting.   Neurological: Positive for headaches.   All other systems reviewed and are negative.    Allergies:  The patient has no known allergies.     Medications:  Mirena IUD  Albuterol   Doxycycline    Past Medical History:     Vitamin D deficiency   [place] teeth removal       Past Surgical History:    C section      Family History:    Uterine cancer  Prostate cancer    Social History:  The patient presents to the ED alone  The patient is     Physical Exam     Patient Vitals for the past 24 hrs:   BP Temp Pulse Resp SpO2 Weight   12/01/21 1827 -- -- 114 (!) 42 95 % --   12/01/21 1826 -- -- -- (!) 38 (!) 87 % --   12/01/21 1808 (!) 142/87 -- (!) 121 25 91 % --   12/01/21 1804 -- 100.4  F (38  C) -- 21 -- 104.3 kg (230 lb)     Physical Exam  Vitals: reviewed by me  General: Pt seen on [facility] [place], pleasant, cooperative, and alert to conversation.  Sick appearing.  Eyes: Tracking well, clear conjunctiva BL  ENT: MMM, midline trachea.   Lungs: Tachypneic, coughing frequently, respiratory distress anytime she moves in the bed, doing okay at rest.   Speaking in full sentences at rest.  CV: Rate as above  MSK: no joint effusion.  No evidence of trauma  Skin: No rash  Neuro: Clear speech and no facial droop.  Psych: Not RIS, no e/o AH/VH      Emergency Department Course     Laboratory:    Labs Ordered and Resulted from Time of ED Arrival to Time of ED Departure   CBC WITH PLATELETS AND DIFFERENTIAL       Result Value    WBC Count 8.4      RBC Count 4.24      Hemoglobin 11.9      Hematocrit 37.2      MCV 88      MCH 28.1      MCHC 32.0      RDW 12.3      Platelet Count 213      % Neutrophils 86      % Lymphocytes 10      % Monocytes 3      % Eosinophils 0      % Basophils 0      % Immature Granulocytes 1      NRBCs per 100 WBC 0      Absolute Neutrophils 7.2      Absolute Lymphocytes 0.9      Absolute Monocytes 0.3      Absolute Eosinophils 0.0      Absolute Basophils 0.0      Absolute Immature Granulocytes 0.1      Absolute NRBCs 0.0     COMPREHENSIVE METABOLIC PANEL   TROPONIN I      Emergency Department Course:  Reviewed:  I reviewed nursing notes, vitals, past medical history, Care Everywhere and [facility]    Assessments:  1813 I obtained history and examined the patient as noted above.   1852 I rechecked the patient and explained findings.     Consults:  1855 I spoke with Dr. Horn, hospitalist, regarding this patient     Interventions:  1824 Ibuprofen 600 mg PO   Decadron 6 mg IV    Disposition:  The patient was admitted to the hospital under the care of Dr. Horn.     Impression & Plan     Medical Decision Making:  This is a 36-year-old unvaccinated female presents the emergency room with decompensation after being diagnosed with COVID in the early hours of this morning.  She tells me that her oxygen saturation is in the 70s at home on her pulse oximeter, and here in the ER, with even slight movement in the bed, she gets down to the mid and high 80s.  I do think she needs to be admitted for Decadron, oxygen supplementation, and consideration of remdesivir.  We  have given her Motrin to help with her fever, she is resting comfortably right now, will plan for admission the care of Dr. Horn, who is kindly agreed accept care of the patient.    Diagnosis:    ICD-10-CM    1. Infection due to 2019 novel coronavirus  U07.1    2. Hypoxia  R09.02      Scribe Disclosure:  I, Jhonatanshelia Avina, am serving as a scribe at 6:13 PM on 12/1/2021 to document services personally performed by Yonatan Hernandez MD, based on my observations and the provider's statements to me.            Yonatan Hernandez MD  12/01/21 2051

## 2021-12-02 NOTE — PHARMACY-ADMISSION MEDICATION HISTORY
Pharmacy Medication History  Admission medication history interview status for the 12/1/2021  admission is complete. See EPIC admission navigator for prior to admission medications     Location of Interview: Phone  Medication history sources: Patient    Significant changes made to the medication list:      In the past week, patient estimated taking medication this percent of the time:     Additional medication history information:       Medication reconciliation completed by provider prior to medication history? No    Time spent in this activity: 5 min    Prior to Admission medications    Medication Sig Last Dose Taking? Auth Provider   albuterol (PROAIR HFA/PROVENTIL HFA/VENTOLIN HFA) 108 (90 Base) MCG/ACT inhaler Inhale 2 puffs into the lungs every 6 hours as needed for shortness of breath / dyspnea or wheezing New, not started  Vane Weiss MD   doxycycline hyclate (VIBRAMYCIN) 100 MG capsule Take 1 capsule (100 mg) by mouth 2 times daily for 7 days New, not started  Vane Weiss MD       The information provided in this note is only as accurate as the sources available at the time of update(s)

## 2021-12-02 NOTE — PROGRESS NOTES
[facility]    Medicine Progress Note - Hospitalist Service       Date of Admission:  12/1/2021    Assessment & Plan           Kim Guillory is a 36 year old unvaccinated female with no other significant past medical history admitted on 12/1/2021 with COVID-19 pneumonia     COVID-19 Diagnosis Details:  Onset of COVID symptoms 5 days ago   Date of positive test results 12/1   Vaccine status Unvaccinated, contemplative. Plans to be vaccinated as soon as cleared.       # Confirmed COVID-19 infection    # Acute Hypoxic Respiratory Failure secondary to COVID-19 infection, improving  Bilateral pneumonia 2/2 COVID-19  - Initial chest CT without contrast showed multifocal airspace disease.   - Admit to medical floor with continuous pulse oximetry, COVID-19 special precautions, minimized lab draws, and care consolidation  - Oxygen: continue current support with nasal cannula at 2 L/min; titrate to keep SpO2 between 90-96%  - Labs: labs notable for Sodium 132, albumin 2.6, D-dimer 0.6 done on admission and reviewed by me. Will trend until patient reaches clinical stability.  - Imaging: Had chest CT to rule out PE last night  - Breathing treatments: albuterol inhaler four times a day scheduled and PRN; avoid nebulizers in favor of MDIs   - IV fluids: not indicated at this time  - Antibiotics: not indicated   - Treatments: Dexamethasone 6 mg x 10 days or until hospital discharge, started on 12/1 and Remdesivir x 5 days or until hospital discharge, started on 12/1  - Research study protocol: Now  - DVT Prophylaxis: At high risk of thrombotic complications due to COVID-19 disease         - PROPHYLACTIC dosing: lovenox 40mg daily  - Discharge Anticoagulation: At discharge consider one of the following for 30 days and until the patient has returned to normal mobility:        - Apixaban (Eliquis) 2.5 mg two times a day        - Rivaroxaban (Xarelto) 10 mg once daily    -titrated off O2 12/2, but  significant dyspnea  -add cepacol and robitussin PRN    Class III obesity  - BMI 41.46  - increased risk of all cause mortality        Diet: Combination Diet Regular Diet Adult    DVT Prophylaxis: Enoxaparin (Lovenox) SQ  Rivera Catheter: Not present  Central Lines: None  Code Status: Full Code      Disposition Plan   Expected discharge: 12/04/2021   recommended to prior living arrangement once resp status improved.     The patient's care was discussed with the Bedside Nurse and Patient.    Rufino Barraza MD  Hospitalist Service  [facility]  Securely message with the Vocera Web Console (learn more here)  Text page via [employer] Paging/Directory        Clinically Significant Risk Factors Present on Admission                   Time Spent on this Encounter   I spent 35 minutes on the unit/floor managing the care of Kim Guillory. Over 50% of my time was spent on the following:   - Counseling the patient and/or family regarding: diagnostic results, prognosis, risks and benefits of treatment options and recommended follow-up  - Coordination of care with the: nurse    Discussed need for vaccine, symptom management, expected course    Rufino Barraza MD    ______________________________________________________________________    Interval History   States she feels somewhat improved. Titrated off O2 early AM. Remains dyspneic    Data reviewed today: I reviewed all medications, new labs and imaging results over the last 24 hours. I personally reviewed no images or EKG's today.    Physical Exam   Vital Signs: Temp: 98  F (36.7  C) Temp src: Oral BP: 118/82 Pulse: 84   Resp: 24 SpO2: 92 % O2 Device: None (Room air) Oxygen Delivery: 1 LPM  Weight: 249 lbs 1.92 oz  Constitutional: Awake, alert, cooperative. Mod resp distress/tachypnea/shallow breathing. obese  Eyes: Conjunctiva and pupils examined and normal.  HEENT: Moist mucous membranes, normal dentition.  Respiratory: distant course sounds.  Tachypnea/shallow breathing.  Cardiovascular: Regular rate and rhythm, normal S1 and S2, and no murmur noted.  GI: Soft, non-distended, non-tender, normal bowel sounds.  Skin: No rashes, no cyanosis, no edema noted on exposed skin.  Musculoskeletal: No joint swelling, erythema or tenderness. No gross bony abnormalities  Neurologic: Cranial nerves 2-12 grossly intact, normal strength and sensation.  Psychiatric: Alert, oriented to person, place and time, no obvious anxiety or depression.      Data   Recent Labs   Lab 12/02/21  0614 12/01/21  1917 12/01/21  1840 12/01/21  0038   WBC 4.9  --  8.4 6.1   HGB 11.9  --  11.9 11.9   MCV 87  --  88 88     --  213 191    135  --  132*   POTASSIUM 4.2 3.6  --  4.2   CHLORIDE 107 104  --  101   CO2 28 25  --  26   BUN 6* 5*  --  5*   CR 0.69 0.72  --  0.79   ANIONGAP 4 6  --  5   ANASTACIA 8.6 7.9*  --  8.0*   * 115*  --  117*   ALBUMIN  --  2.5*  --  2.6*   PROTTOTAL  --  7.6  --  7.8   BILITOTAL  --  0.4  --  0.4   ALKPHOS  --  40  --  42   ALT  --  22  --  25   AST  --  21  --  22   TROPONIN  --   --   --  <0.015     No results found for this or any previous visit (from the past 24 hour(s)).  Medications     - MEDICATION INSTRUCTIONS -         remdesivir  100 mg Intravenous Q24H    And     sodium chloride 0.9%  50 mL Intravenous Q24H     dexamethasone  6 mg Oral Daily     enoxaparin ANTICOAGULANT  40 mg Subcutaneous Q12H     sodium chloride (PF)  3 mL Intracatheter Q8H

## 2021-12-02 NOTE — ED TRIAGE NOTES
[facility]  ED Arrival Note    Arrives through triage. ABC's intact. A &O X4. . Pt arrives with c/o worsening SOB and dizziness. Seen yesterday for COVID pneumonia. Patient also reports SPO2 readings of 75% at home. In triage, patient is tachycardic, tachypneic, and has an SPO2 of 91%.      Visitors during triage: None      Triage Interventions: Direct rooming     Ambulatory: Yes    Meets Stroke Criteria?: No    Meets Trauma Criteria?: No    Shock Index: >0.8, for provider reference    Directed to: Main ED

## 2021-12-02 NOTE — ED NOTES
[facility]  ED Nurse Handoff Report    ED Chief complaint: Dizziness and Shortness of Breath      ED Diagnosis:   Final diagnoses:   None       Code Status: Full Code    Allergies: No Known Allergies    Patient Story: Pt presents from home, covid positive, with SOB and hypoxia. Pt is tachynic and has some grunting respirations. Pt reports pulse ox at home ranging from 75-82%. In ED, at rest pulse ox 91% and with movement or drinking water o2 down to 87%. Chest CT performed yesterday during a visit and showing covid PNA.   Focused Assessment:  A.o x4, normally independent, tachypnic, hypoxic, on 2 liters NC in ED     Treatments and/or interventions provided: IVF, decadron, blood work   Patient's response to treatments and/or interventions: tolerating well     To be done/followed up on inpatient unit:  inpt orders    Does this patient have any cognitive concerns?: none    Activity level - Baseline/Home:  Independent  Activity Level - Current:   Independent    Patient's Preferred language: English   Needed?: Yes    Isolation: COVID r/o and special precautions  Infection: COVID r/o and special precautions  Patient tested for COVID 19 prior to admission: NO  Bariatric?: No    Vital Signs:   Vitals:    12/01/21 1804 12/01/21 1808 12/01/21 1826 12/01/21 1827   BP:  (!) 142/87     Pulse:  (!) 121  114   Resp: 21 25 (!) 38 (!) 42   Temp: 100.4  F (38  C)      SpO2:  91% (!) 87% 95%   Weight: 104.3 kg (230 lb)          Cardiac Rhythm:     Was the PSS-3 completed:   Yes  What interventions are required if any?               Family Comments: none present  OBS brochure/video discussed/provided to patient/family: N/A              Name of person given brochure if not patient: n/a              Relationship to patient: n/a    For the majority of the shift this patient's behavior was Green.   Behavioral interventions performed were none.    ED NURSE PHONE NUMBER: *[phone]

## 2021-12-02 NOTE — PROGRESS NOTES
"CLINICAL NUTRITION SERVICES  -  ASSESSMENT NOTE    Malnutrition:   % Weight Loss:  Unable to assess  % Intake:  Not suspected  Subcutaneous Fat Loss:  Deferred  Muscle Loss:  Deferred  Fluid Retention:  None noted    Malnutrition Diagnosis: Unable to determine due to lack of physical exam/nutrition hx     REASON FOR ASSESSMENT  Kim Guillory is a 36 year old female seen by Registered Dietitian for Nutrition Admission Risk Screen Received -   Have you recently lost weight without trying in the last 6 months ? - \"yes 2-13 lbs\"  Have you been eating poorly due to a decreased appetite ?- \"yes\"      NUTRITION HISTORY  - Information obtained from chart review. Attempted to call into room per COVID guidelines, however could not reach the patient.   - Presented with dizziness and shortness of breath, found to be COVID +.    CURRENT NUTRITION ORDERS  Diet Order:     Regular     Current Intake/Tolerance:  Ordered 1 small meal this afternoon from room service. No intakes documented.     NUTRITION FOCUSED PHYSICAL ASSESSMENT FOR DIAGNOSING MALNUTRITION)  No:  COVID Iso    Obtained from Chart/Interdisciplinary Team:  Last BM 12/2  [facility] - Nutrition 3; Total 21    ANTHROPOMETRICS  Height: 5' 5\"  Weight: 249 lbs 1.92 oz (113 kg)   Body mass index is 41.46 kg/m .  Weight Status:  Obesity Grade III BMI >40  IBW: 56.8 kg   % IBW: 199%  Weight History: Unable to fully assess given lack of weight history.   Wt Readings from Last 10 Encounters:   12/02/21 113 kg (249 lb 1.9 oz)   11/30/21 104.3 kg (230 lb)       LABS  Labs reviewed   (H)  Recent Labs   Lab 12/02/21  0614 12/01/21  1917 12/01/21  0038   * 115* 117*       MEDICATIONS  Medications reviewed    ASSESSED NUTRITION NEEDS PER APPROVED PRACTICE GUIDELINES:  Dosing Weight 71 kg - adjusted   Estimated Energy Needs: 0446-0361 kcals (25-30 Kcal/Kg)  Justification: maintenance  Estimated Protein Needs:  grams protein (1.2-1.5 g pro/Kg)  Justification: " maintenance  Estimated Fluid Needs: 1 mL/kcal   Justification: maintenance    MALNUTRITION:  % Weight Loss:  Unable to assess  % Intake:  Not suspected  Subcutaneous Fat Loss:  Deferred  Muscle Loss:  Deferred  Fluid Retention:  None noted    Malnutrition Diagnosis: Unable to determine due to lack of physical exam/nutrition hx    NUTRITION DIAGNOSIS:  Predicted inadequate nutrient intake related to potential for poor appetite w/ COVID and hospitalization       NUTRITION INTERVENTIONS  Recommendations / Nutrition Prescription  Regular diet       Implementation  Nutrition education: Per Provider order if indicated       Nutrition Goals  Intake of at least 75% meals TID.       MONITORING AND EVALUATION:  Progress towards goals will be monitored and evaluated per protocol and Practice Guidelines    Zuly Ferraro RD, LD  [facility]  Pager: [phone]  Weekend Pager: [phone]

## 2021-12-02 NOTE — PLAN OF CARE
Summary:  COVID+   DATE & TIME: 12/02/21 5802-9438  Cognitive Concerns/ Orientation : A&O x4   BEHAVIOR & AGGRESSION TOOL COLOR: Green  CIWA SCORE: NA   ABNL VS/O2: VSS RA  MOBILITY: IND, calls if needs assistance  PAIN MANAGMENT: Headache rating at 7 on a scale 0-10. PRN tylenol given   DIET: Regular  BOWEL/BLADDER: Continent to BR. Reports diarrhea every time she goes to the bathroom  ABNL LAB/BG: COVID labs: CRP:212 Lactate: 342, Fibrinogen 802, D-dimer: 0.71  DRAIN/DEVICES: PIV R arm saline locked  TELEMETRY RHYTHM: NA  SKIN: WNL  TESTS/PROCEDURES: None  D/C DAY/GOALS/PLACE: Discharge pending clinical improvement  OTHER IMPORTANT INFO: Special precautions maintained for COVID +; QUILES; frequent productive cough. Instructed and encouraged IS. Continuing the Decadron.

## 2021-12-03 LAB
ANION GAP SERPL CALCULATED.3IONS-SCNC: 7 MMOL/L (ref 3–14)
BUN SERPL-MCNC: 10 MG/DL (ref 7–30)
CALCIUM SERPL-MCNC: 8.6 MG/DL (ref 8.5–10.1)
CHLORIDE BLD-SCNC: 106 MMOL/L (ref 94–109)
CO2 SERPL-SCNC: 27 MMOL/L (ref 20–32)
CREAT SERPL-MCNC: 0.7 MG/DL (ref 0.52–1.04)
CRP SERPL-MCNC: 136 MG/L (ref 0–8)
D DIMER PPP FEU-MCNC: 0.29 UG/ML FEU (ref 0–0.5)
ERYTHROCYTE [DISTWIDTH] IN BLOOD BY AUTOMATED COUNT: 12.5 % (ref 10–15)
FIBRINOGEN PPP-MCNC: 697 MG/DL (ref 170–490)
GFR SERPL CREATININE-BSD FRML MDRD: >90 ML/MIN/1.73M2
GLUCOSE BLD-MCNC: 148 MG/DL (ref 70–99)
HCT VFR BLD AUTO: 36.1 % (ref 35–47)
HGB BLD-MCNC: 11.4 G/DL (ref 11.7–15.7)
MCH RBC QN AUTO: 27.6 PG (ref 26.5–33)
MCHC RBC AUTO-ENTMCNC: 31.6 G/DL (ref 31.5–36.5)
MCV RBC AUTO: 87 FL (ref 78–100)
PLATELET # BLD AUTO: 270 10E3/UL (ref 150–450)
POTASSIUM BLD-SCNC: 3.8 MMOL/L (ref 3.4–5.3)
RBC # BLD AUTO: 4.13 10E6/UL (ref 3.8–5.2)
SODIUM SERPL-SCNC: 140 MMOL/L (ref 133–144)
WBC # BLD AUTO: 11.6 10E3/UL (ref 4–11)

## 2021-12-03 PROCEDURE — 120N000001 HC R&B MED SURG/OB

## 2021-12-03 PROCEDURE — 85041 AUTOMATED RBC COUNT: CPT | Performed by: INTERNAL MEDICINE

## 2021-12-03 PROCEDURE — 250N000012 HC RX MED GY IP 250 OP 636 PS 637: Performed by: INTERNAL MEDICINE

## 2021-12-03 PROCEDURE — 250N000011 HC RX IP 250 OP 636: Performed by: INTERNAL MEDICINE

## 2021-12-03 PROCEDURE — 250N000013 HC RX MED GY IP 250 OP 250 PS 637: Performed by: STUDENT IN AN ORGANIZED HEALTH CARE EDUCATION/TRAINING PROGRAM

## 2021-12-03 PROCEDURE — 86140 C-REACTIVE PROTEIN: CPT | Performed by: INTERNAL MEDICINE

## 2021-12-03 PROCEDURE — 250N000009 HC RX 250: Performed by: INTERNAL MEDICINE

## 2021-12-03 PROCEDURE — 99233 SBSQ HOSP IP/OBS HIGH 50: CPT | Performed by: INTERNAL MEDICINE

## 2021-12-03 PROCEDURE — 258N000003 HC RX IP 258 OP 636: Performed by: INTERNAL MEDICINE

## 2021-12-03 PROCEDURE — 85384 FIBRINOGEN ACTIVITY: CPT | Performed by: INTERNAL MEDICINE

## 2021-12-03 PROCEDURE — 85379 FIBRIN DEGRADATION QUANT: CPT | Performed by: INTERNAL MEDICINE

## 2021-12-03 PROCEDURE — 80048 BASIC METABOLIC PNL TOTAL CA: CPT | Performed by: INTERNAL MEDICINE

## 2021-12-03 PROCEDURE — 36415 COLL VENOUS BLD VENIPUNCTURE: CPT | Performed by: INTERNAL MEDICINE

## 2021-12-03 PROCEDURE — 250N000013 HC RX MED GY IP 250 OP 250 PS 637: Performed by: INTERNAL MEDICINE

## 2021-12-03 RX ADMIN — ENOXAPARIN SODIUM 40 MG: 40 INJECTION SUBCUTANEOUS at 21:17

## 2021-12-03 RX ADMIN — GUAIFENESIN 10 ML: 200 SOLUTION ORAL at 09:27

## 2021-12-03 RX ADMIN — SODIUM CHLORIDE 50 ML: 9 INJECTION, SOLUTION INTRAVENOUS at 21:23

## 2021-12-03 RX ADMIN — REMDESIVIR 100 MG: 100 INJECTION, POWDER, LYOPHILIZED, FOR SOLUTION INTRAVENOUS at 21:29

## 2021-12-03 RX ADMIN — GUAIFENESIN 10 ML: 200 SOLUTION ORAL at 21:16

## 2021-12-03 RX ADMIN — ENOXAPARIN SODIUM 40 MG: 40 INJECTION SUBCUTANEOUS at 09:27

## 2021-12-03 RX ADMIN — ACETAMINOPHEN 650 MG: 325 TABLET, FILM COATED ORAL at 21:16

## 2021-12-03 RX ADMIN — DEXAMETHASONE 6 MG: 2 TABLET ORAL at 12:56

## 2021-12-03 ASSESSMENT — ACTIVITIES OF DAILY LIVING (ADL)
ADLS_ACUITY_SCORE: 5
DIFFICULTY_COMMUNICATING: NO
ADLS_ACUITY_SCORE: 5
ADLS_ACUITY_SCORE: 6
ADLS_ACUITY_SCORE: 9
ADLS_ACUITY_SCORE: 5
ADLS_ACUITY_SCORE: 9
ADLS_ACUITY_SCORE: 9
WEAR_GLASSES_OR_BLIND: NO
ADLS_ACUITY_SCORE: 9
WALKING_OR_CLIMBING_STAIRS_DIFFICULTY: NO
ADLS_ACUITY_SCORE: 5
ADLS_ACUITY_SCORE: 9
ADLS_ACUITY_SCORE: 5
ADLS_ACUITY_SCORE: 9
ADLS_ACUITY_SCORE: 5
ADLS_ACUITY_SCORE: 9
FALL_HISTORY_WITHIN_LAST_SIX_MONTHS: NO
ADLS_ACUITY_SCORE: 5
DRESSING/BATHING_DIFFICULTY: NO
HEARING_DIFFICULTY_OR_DEAF: NO
PATIENT_/_FAMILY_COMMUNICATION_STYLE: SPOKEN LANGUAGE (ENGLISH OR BILINGUAL)
DOING_ERRANDS_INDEPENDENTLY_DIFFICULTY: NO
ADLS_ACUITY_SCORE: 9
DIFFICULTY_EATING/SWALLOWING: NO
ADLS_ACUITY_SCORE: 9
ADLS_ACUITY_SCORE: 5
ADLS_ACUITY_SCORE: 9
CONCENTRATING,_REMEMBERING_OR_MAKING_DECISIONS_DIFFICULTY: NO
ADLS_ACUITY_SCORE: 5
ADLS_ACUITY_SCORE: 5
TOILETING_ISSUES: NO
ADLS_ACUITY_SCORE: 5

## 2021-12-03 NOTE — PROGRESS NOTES
[facility]    Medicine Progress Note - Hospitalist Service       Date of Admission:  12/1/2021    Assessment & Plan             Kim Guillory is a 36 year old unvaccinated female with no other significant past medical history admitted on 12/1/2021 with COVID-19 pneumonia     COVID-19 Diagnosis Details:  Onset of COVID symptoms 5 days ago   Date of positive test results 12/1   Vaccine status Unvaccinated, contemplative. Plans to be vaccinated as soon as cleared.       # Confirmed COVID-19 infection    # Acute Hypoxic Respiratory Failure secondary to COVID-19 infection, improving  Bilateral pneumonia 2/2 COVID-19  - Initial chest CT without contrast showed multifocal airspace disease.   - Admit to medical floor with continuous pulse oximetry, COVID-19 special precautions, minimized lab draws, and care consolidation  - Oxygen: Patient is requiring a 5 to 6 L of oxygen with minimal activity.  - Labs: Covid labs reviewed, inflammatory markers are trending down, mild elevation in white count noted, possibly secondary to steroids.  - Imaging:  Repeat CT chest did not indicate any PE.  - Breathing treatments: albuterol inhaler four times a day scheduled and PRN; avoid nebulizers in favor of MDIs   - IV fluids: not indicated at this time  - Antibiotics: not indicated   - Treatments: Dexamethasone 6 mg x 10 days or until hospital discharge, started on 12/1 and Remdesivir x 5 days or until hospital discharge, started on 12/1  - Research study protocol: Now  - DVT Prophylaxis: At high risk of thrombotic complications due to COVID-19 disease         - PROPHYLACTIC dosing: lovenox 40mg daily  - Discharge Anticoagulation: At discharge consider one of the following for 30 days and until the patient has returned to normal mobility:        - Apixaban (Eliquis) 2.5 mg two times a day        - Rivaroxaban (Xarelto) 10 mg once daily    Since patient has ongoing significant dyspnea we'll continue remdesivir  and wait till she finishes 5 days.    Class III obesity  - BMI 41.46  - increased risk of all cause mortality        Diet: Combination Diet Regular Diet Adult    DVT Prophylaxis: Enoxaparin (Lovenox) SQ  Rivera Catheter: Not present  Central Lines: None  Code Status: Full Code      Disposition Plan   Expected discharge: 12/04/2021   recommended to prior living arrangement once resp status improved.     The patient's care was discussed with the Bedside Nurse and Patient.    Dione Patel MD  Hospitalist Service  [facility]  Securely message with the Vocera Web Console (learn more here)  Text page via [employer] Paging/Directory        Clinically Significant Risk Factors Present on Admission                Time Spent on this Encounter   I spent 35 minutes on the unit/floor managing the care of Kim Guillory. Over 50% of my time was spent on the following:   - Counseling the patient and/or family regarding: diagnostic results, prognosis, risks and benefits of treatment options and recommended follow-up  - Coordination of care with the: nurse    Dione Patel MD    ______________________________________________________________________    Interval History   I visited with patient, she was coming back from the restroom and she couldn't move more than 2 steps without increasing her oxygen supply, she was on 2 L when she went to the restroom, had to increase to 5 L when she came back, patient is quite deconditioned and is unable to finish a sentence without stopping in between. Patient's a daughter who is 13 years old and her spouse is also Covid, currently did not hypoxic, spouse had already got vaccinated. She intends to vaccinate her daughter once she recovers.  Data reviewed today: I reviewed all medications, new labs and imaging results over the last 24 hours. I personally reviewed no images or EKG's today.    Physical Exam   Vital Signs: Temp: 98.4  F (36.9  C) Temp src: Oral BP: 116/77  Pulse: 90   Resp: 18 SpO2: 97 % O2 Device: Nasal cannula Oxygen Delivery: 2 LPM  Weight: 249 lbs 1.92 oz  Constitutional: Awake, alert, cooperative, no apparent distress, morbidly obese  Respiratory: Breath sounds reduced bilateral bases  Cardiovascular: Regular rate and rhythm, normal S1 and S2, and no murmur noted  GI: Normal bowel sounds, soft, non-distended, non-tender  Skin/Integumen: No rashes, no cyanosis, no edema  Neuro : moving all 4 extremities, no focal deficit noted         Data   Recent Labs   Lab 12/03/21  0913 12/02/21  0614 12/01/21  1917 12/01/21  1840 12/01/21  0038   WBC 11.6* 4.9  --  8.4 6.1   HGB 11.4* 11.9  --  11.9 11.9   MCV 87 87  --  88 88    215  --  213 191    139 135  --  132*   POTASSIUM 3.8 4.2 3.6  --  4.2   CHLORIDE 106 107 104  --  101   CO2 27 28 25  --  26   BUN 10 6* 5*  --  5*   CR 0.70 0.69 0.72  --  0.79   ANIONGAP 7 4 6  --  5   ANASTACIA 8.6 8.6 7.9*  --  8.0*   * 170* 115*  --  117*   ALBUMIN  --   --  2.5*  --  2.6*   PROTTOTAL  --   --  7.6  --  7.8   BILITOTAL  --   --  0.4  --  0.4   ALKPHOS  --   --  40  --  42   ALT  --   --  22  --  25   AST  --   --  21  --  22   TROPONIN  --   --   --   --  <0.015     No results found for this or any previous visit (from the past 24 hour(s)).  Medications     - MEDICATION INSTRUCTIONS -         remdesivir  100 mg Intravenous Q24H    And     sodium chloride 0.9%  50 mL Intravenous Q24H     dexamethasone  6 mg Oral Daily     enoxaparin ANTICOAGULANT  40 mg Subcutaneous Q12H     sodium chloride (PF)  3 mL Intracatheter Q8H

## 2021-12-03 NOTE — PLAN OF CARE
Summary: COVID+   DATE & TIME: 12/02/21 1900 - 2300  Cognitive Concerns/ Orientation : A&O x4   BEHAVIOR & AGGRESSION TOOL COLOR: Green  CIWA SCORE: NA   ABNL VS/O2: VSS RA. Desat to 89 -90 at 2130. Placed on @L and now sating at 96 -97%  MOBILITY: IND, calls if needs assistance  PAIN MANAGMENT: Denies pain  DIET: Regular- low appetite  BOWEL/BLADDER: Continent to BR. Reports diarrhea every time she goes to the bathroom. None this shift.  ABNL LAB/BG: COVID labs: CRP:212 Lactate: 342, Fibrinogen 802, D-dimer: 0.71  DRAIN/DEVICES: PIV R arm saline locked  TELEMETRY RHYTHM: NA  SKIN: WNL  TESTS/PROCEDURES: None  D/C DAY/GOALS/PLACE: Discharge pending clinical improvement  OTHER IMPORTANT INFO: Special precautions maintained for COVID +; QUILES; frequent productive cough. Instructed and encouraged IS and pt is doing poor on IS. Continuing the Decadron. Robitussin given for cough. Supplement O2 at 2L.

## 2021-12-03 NOTE — PLAN OF CARE
Summary: COVID+   DATE & TIME: 12/2/21-12/3/21 1807-8665  Cognitive Concerns/ Orientation : A&O x4   BEHAVIOR & AGGRESSION TOOL COLOR: Green  CIWA SCORE: N/A   ABNL VS/O2: VSS on 2L NC  MOBILITY: IND, calls if needs assistance  PAIN MANAGMENT: Denies  DIET: Regular  BOWEL/BLADDER: Continent to BR.   ABNL LAB/BG: N/A  DRAIN/DEVICES: PIV SL  TELEMETRY RHYTHM: N/A  SKIN: WNL  TESTS/PROCEDURES: None  D/C DAY/GOALS/PLACE: Discharge pending clinical improvement  OTHER IMPORTANT INFO: Special precautions maintained for COVID +; QUILES; frequent productive cough. Instructed and encouraged IS pt only able to get to 250. Pt on Remdesivir, lovenox and decadron protocol.

## 2021-12-03 NOTE — PLAN OF CARE
Summary: COVID+   DATE & TIME: 12/02/21 [id number]  Cognitive Concerns/ Orientation : A&O x4   BEHAVIOR & AGGRESSION TOOL COLOR: Green  CIWA SCORE: NA   ABNL VS/O2: VSS RA  MOBILITY: IND, calls if needs assistance  PAIN MANAGMENT: Headache rating at 7 on a scale 0-10. PRN tylenol given   DIET: Regular- low appetite  BOWEL/BLADDER: Continent to BR. Reports diarrhea every time she goes to the bathroom  ABNL LAB/BG: COVID labs: CRP:212 Lactate: 342, Fibrinogen 802, D-dimer: 0.71  DRAIN/DEVICES: PIV R arm saline locked  TELEMETRY RHYTHM: NA  SKIN: WNL  TESTS/PROCEDURES: None  D/C DAY/GOALS/PLACE: Discharge pending clinical improvement  OTHER IMPORTANT INFO: Special precautions maintained for COVID +; QUILES; frequent productive cough. Instructed and encouraged IS. Continuing the Decadron. Robitussin given for cough. Not needing supplemental 02 at this time

## 2021-12-03 NOTE — PROGRESS NOTES
Patient has been assessed for Home Oxygen needs. Oxygen readings:    *Pulse oximetry (SpO2) = 94% on room air at rest while awake.    *SpO2 improved to 94% on 0liters/minute at rest.    *SpO2 = 80% on room air during activity/with exercise.    *SpO2 improved to 94% on 10liters/minute during activity/with exercise.

## 2021-12-03 NOTE — PLAN OF CARE
Summary: COVID+   DATE & TIME: 12/3/21 9933-4300  Cognitive Concerns/ Orientation : A&O x4   BEHAVIOR & AGGRESSION TOOL COLOR: Green  CIWA SCORE: N/A   ABNL VS/O2: VSS on 2L NC. Desats to 80% when ambulating, needs increase in oxygen demands at this time   MOBILITY: SBA dt QUILES   PAIN MANAGMENT: Denies  DIET: Regular  BOWEL/BLADDER: Continent to BSC.   ABNL LAB/BG: N/A  DRAIN/DEVICES: PIV SL  TELEMETRY RHYTHM: N/A  SKIN: WNL  TESTS/PROCEDURES: Oxygen study completed this shift, but needed 10 L with activity to get back up to 95 %.   D/C DAY/GOALS/PLACE: Discharge pending clinical improvement  OTHER IMPORTANT INFO: Special precautions maintained for COVID +; QUILES; frequent productive cough. Instructed and encouraged IS pt only able to get to 250. Pt on Remdesivir, lovenox and decadron protocol.

## 2021-12-04 LAB
ANION GAP SERPL CALCULATED.3IONS-SCNC: 6 MMOL/L (ref 3–14)
BASE EXCESS BLDV CALC-SCNC: 3.8 MMOL/L (ref -7.7–1.9)
BUN SERPL-MCNC: 12 MG/DL (ref 7–30)
CALCIUM SERPL-MCNC: 8.4 MG/DL (ref 8.5–10.1)
CHLORIDE BLD-SCNC: 106 MMOL/L (ref 94–109)
CO2 SERPL-SCNC: 26 MMOL/L (ref 20–32)
CREAT SERPL-MCNC: 0.64 MG/DL (ref 0.52–1.04)
CRP SERPL-MCNC: 82.7 MG/L (ref 0–8)
D DIMER PPP FEU-MCNC: <0.27 UG/ML FEU (ref 0–0.5)
ERYTHROCYTE [DISTWIDTH] IN BLOOD BY AUTOMATED COUNT: 12.4 % (ref 10–15)
FIBRINOGEN PPP-MCNC: 687 MG/DL (ref 170–490)
GFR SERPL CREATININE-BSD FRML MDRD: >90 ML/MIN/1.73M2
GLUCOSE BLD-MCNC: 146 MG/DL (ref 70–99)
HCO3 BLDV-SCNC: 28 MMOL/L (ref 21–28)
HCT VFR BLD AUTO: 35.4 % (ref 35–47)
HGB BLD-MCNC: 11.4 G/DL (ref 11.7–15.7)
MCH RBC QN AUTO: 28 PG (ref 26.5–33)
MCHC RBC AUTO-ENTMCNC: 32.2 G/DL (ref 31.5–36.5)
MCV RBC AUTO: 87 FL (ref 78–100)
O2/TOTAL GAS SETTING VFR VENT: 34 %
PCO2 BLDV: 40 MM HG (ref 40–50)
PH BLDV: 7.45 [PH] (ref 7.32–7.43)
PLATELET # BLD AUTO: 342 10E3/UL (ref 150–450)
PO2 BLDV: 56 MM HG (ref 25–47)
POTASSIUM BLD-SCNC: 3.8 MMOL/L (ref 3.4–5.3)
RBC # BLD AUTO: 4.07 10E6/UL (ref 3.8–5.2)
SODIUM SERPL-SCNC: 138 MMOL/L (ref 133–144)
WBC # BLD AUTO: 10.8 10E3/UL (ref 4–11)

## 2021-12-04 PROCEDURE — 99233 SBSQ HOSP IP/OBS HIGH 50: CPT | Performed by: INTERNAL MEDICINE

## 2021-12-04 PROCEDURE — 258N000003 HC RX IP 258 OP 636: Performed by: INTERNAL MEDICINE

## 2021-12-04 PROCEDURE — 120N000001 HC R&B MED SURG/OB

## 2021-12-04 PROCEDURE — 85384 FIBRINOGEN ACTIVITY: CPT | Performed by: INTERNAL MEDICINE

## 2021-12-04 PROCEDURE — 250N000009 HC RX 250: Performed by: INTERNAL MEDICINE

## 2021-12-04 PROCEDURE — 85027 COMPLETE CBC AUTOMATED: CPT | Performed by: INTERNAL MEDICINE

## 2021-12-04 PROCEDURE — 250N000011 HC RX IP 250 OP 636: Performed by: INTERNAL MEDICINE

## 2021-12-04 PROCEDURE — 85379 FIBRIN DEGRADATION QUANT: CPT | Performed by: INTERNAL MEDICINE

## 2021-12-04 PROCEDURE — 250N000013 HC RX MED GY IP 250 OP 250 PS 637: Performed by: INTERNAL MEDICINE

## 2021-12-04 PROCEDURE — 250N000013 HC RX MED GY IP 250 OP 250 PS 637: Performed by: STUDENT IN AN ORGANIZED HEALTH CARE EDUCATION/TRAINING PROGRAM

## 2021-12-04 PROCEDURE — 36415 COLL VENOUS BLD VENIPUNCTURE: CPT | Performed by: INTERNAL MEDICINE

## 2021-12-04 PROCEDURE — 86140 C-REACTIVE PROTEIN: CPT | Performed by: INTERNAL MEDICINE

## 2021-12-04 PROCEDURE — 999N000157 HC STATISTIC RCP TIME EA 10 MIN

## 2021-12-04 PROCEDURE — 250N000012 HC RX MED GY IP 250 OP 636 PS 637: Performed by: INTERNAL MEDICINE

## 2021-12-04 PROCEDURE — 80048 BASIC METABOLIC PNL TOTAL CA: CPT | Performed by: INTERNAL MEDICINE

## 2021-12-04 PROCEDURE — 82803 BLOOD GASES ANY COMBINATION: CPT | Performed by: INTERNAL MEDICINE

## 2021-12-04 RX ADMIN — ENOXAPARIN SODIUM 40 MG: 40 INJECTION SUBCUTANEOUS at 09:00

## 2021-12-04 RX ADMIN — ENOXAPARIN SODIUM 40 MG: 40 INJECTION SUBCUTANEOUS at 20:50

## 2021-12-04 RX ADMIN — BARICITINIB 4 MG: 2 TABLET, FILM COATED ORAL at 13:57

## 2021-12-04 RX ADMIN — REMDESIVIR 100 MG: 100 INJECTION, POWDER, LYOPHILIZED, FOR SOLUTION INTRAVENOUS at 20:49

## 2021-12-04 RX ADMIN — GUAIFENESIN 10 ML: 200 SOLUTION ORAL at 21:53

## 2021-12-04 RX ADMIN — DEXAMETHASONE 6 MG: 2 TABLET ORAL at 13:57

## 2021-12-04 ASSESSMENT — ACTIVITIES OF DAILY LIVING (ADL)
ADLS_ACUITY_SCORE: 5
ADLS_ACUITY_SCORE: 7
ADLS_ACUITY_SCORE: 6
ADLS_ACUITY_SCORE: 5
ADLS_ACUITY_SCORE: 5
ADLS_ACUITY_SCORE: 6
ADLS_ACUITY_SCORE: 5
ADLS_ACUITY_SCORE: 6
ADLS_ACUITY_SCORE: 5
ADLS_ACUITY_SCORE: 7
ADLS_ACUITY_SCORE: 5
ADLS_ACUITY_SCORE: 7
ADLS_ACUITY_SCORE: 7
ADLS_ACUITY_SCORE: 5
ADLS_ACUITY_SCORE: 6
ADLS_ACUITY_SCORE: 5
ADLS_ACUITY_SCORE: 6
ADLS_ACUITY_SCORE: 7

## 2021-12-04 NOTE — PROGRESS NOTES
[facility]    Medicine Progress Note - Hospitalist Service       Date of Admission:  12/1/2021    Assessment & Plan             Kim Guillory is a 36 year old unvaccinated female with no other significant past medical history admitted on 12/1/2021 with COVID-19 pneumonia     COVID-19 Diagnosis Details:  Onset of COVID symptoms 5 days ago   Date of positive test results 12/1   Vaccine status Unvaccinated, contemplative. Plans to be vaccinated as soon as cleared.       # Confirmed COVID-19 infection    # Acute Hypoxic Respiratory Failure secondary to COVID-19 infection, improving  Bilateral pneumonia 2/2 COVID-19  - Initial chest CT without contrast showed multifocal airspace disease.   - Admit to medical floor with continuous pulse oximetry, COVID-19 special precautions, minimized lab draws, and care consolidation  - Oxygen: Her oxygen needs went up from 12/3-12/4, she was on 5 L, did go up to high flow oxygen 30 L.  - Labs: Covid labs reviewed, inflammatory markers are trending down but slowly, mild elevation in white count noted, possibly secondary to steroids.  - Imaging:  Repeat CT chest did not indicate any PE.  - Breathing treatments: albuterol inhaler four times a day scheduled and PRN; avoid nebulizers in favor of MDIs   - IV fluids: not indicated at this time  - Antibiotics: not indicated   - Treatments: Dexamethasone 6 mg x 10 days or until hospital discharge, started on 12/1 and Remdesivir x 5 days or until hospital discharge, started on 12/1, due to her increased oxygen needs, discussed about baricitinib to the patient, she agreed to proceed with treatment, started 12/4.  - DVT Prophylaxis: At high risk of thrombotic complications due to COVID-19 disease         - PROPHYLACTIC dosing: lovenox 40mg daily  - Discharge Anticoagulation: At discharge consider one of the following for 30 days and until the patient has returned to normal mobility:        - Apixaban (Eliquis) 2.5 mg  two times a day        - Rivaroxaban (Xarelto) 10 mg once daily    Since patient has ongoing significant dyspnea we'll continue remdesivir and wait till she finishes 5 days.    Class III obesity  - BMI 41.46  - increased risk of all cause mortality        Diet: Combination Diet Regular Diet Adult    DVT Prophylaxis: Enoxaparin (Lovenox) SQ  Rivera Catheter: Not present  Central Lines: None  Code Status: Full Code      Disposition Plan   Expected discharge: 12/06/2021   recommended to prior living arrangement once resp status improved.     The patient's care was discussed with the Bedside Nurse and Patient.    Dione Patel MD  Hospitalist Service  [facility]  Securely message with the Vocera Web Console (learn more here)  Text page via [employer]        Clinically Significant Risk Factors Present on Admission                Time Spent on this Encounter   I spent 35 minutes on the unit/floor managing the care of Kim Guillory. Over 50% of my time was spent on the following:   - Counseling the patient and/or family regarding: diagnostic results, prognosis, risks and benefits of treatment options and recommended follow-up  - Coordination of care with the: nurse    Dione Patel MD    ______________________________________________________________________    Interval History   Patient starting today morning was more hypoxic, she had a increased respiratory effort and rate went up to 40s, discussed with nursing, assessed by respiratory therapy and they started her on high flow, when I visited with her I discussed about baricitinib and plan to initiate that, patient was agreeable to this, continue on high flow for now for hypoxia.  Data reviewed today: I reviewed all medications, new labs and imaging results over the last 24 hours. I personally reviewed no images or EKG's today.    Physical Exam   Vital Signs: Temp: 98.7  F (37.1  C) Temp src: Oral BP: 119/74 Pulse: 67   Resp:  (!) 38 SpO2: 94 % O2 Device: High Flow Nasal Cannula (HFNC) Oxygen Delivery: 30 LPM  Weight: 249 lbs 1.92 oz  Constitutional: Awake, alert, cooperative, no apparent distress, morbidly obese  Respiratory: Breath sounds reduced bilateral bases  Cardiovascular: Regular rate and rhythm, normal S1 and S2, and no murmur noted  GI: Normal bowel sounds, soft, non-distended, non-tender  Skin/Integumen: No rashes, no cyanosis, no edema  Neuro : moving all 4 extremities, no focal deficit noted         Data   Recent Labs   Lab 12/04/21  0746 12/03/21  0913 12/02/21  0614 12/01/21  1917 12/01/21  1840 12/01/21  0038   WBC 10.8 11.6* 4.9  --    < > 6.1   HGB 11.4* 11.4* 11.9  --    < > 11.9   MCV 87 87 87  --    < > 88    270 215  --    < > 191    140 139 135  --  132*   POTASSIUM 3.8 3.8 4.2 3.6  --  4.2   CHLORIDE 106 106 107 104  --  101   CO2 26 27 28 25  --  26   BUN 12 10 6* 5*  --  5*   CR 0.64 0.70 0.69 0.72  --  0.79   ANIONGAP 6 7 4 6  --  5   ANASTACIA 8.4* 8.6 8.6 7.9*  --  8.0*   * 148* 170* 115*  --  117*   ALBUMIN  --   --   --  2.5*  --  2.6*   PROTTOTAL  --   --   --  7.6  --  7.8   BILITOTAL  --   --   --  0.4  --  0.4   ALKPHOS  --   --   --  40  --  42   ALT  --   --   --  22  --  25   AST  --   --   --  21  --  22   TROPONIN  --   --   --   --   --  <0.015    < > = values in this interval not displayed.     No results found for this or any previous visit (from the past 24 hour(s)).  Medications     - MEDICATION INSTRUCTIONS -         remdesivir  100 mg Intravenous Q24H    And     sodium chloride 0.9%  50 mL Intravenous Q24H     dexamethasone  6 mg Oral Daily     enoxaparin ANTICOAGULANT  40 mg Subcutaneous Q12H     sodium chloride (PF)  3 mL Intracatheter Q8H

## 2021-12-04 NOTE — PLAN OF CARE
A&O x4, VSS on 2L NC. SBA in room. Voiding well per BSC. Has dyspnea with any exertion. Denies pain when asked. Infrequent productive cough noted. Covid precaution maintained.

## 2021-12-04 NOTE — PROVIDER NOTIFICATION
MD Notification    Notified Person: MD    Notified Person Name:Dr Patel    Notification Date/Time: 12/4/21 0912    Notification Interaction: Marco paged MD    Purpose of Notification: Pt RR high 30-low 40, had not been previously charted this high.  Oxygen mid 90's on 2L.  Pt states her breathing feels the same.  Shallow.  Dropped to high 80's turning on side to assess lungs.    Orders Received:Can increase oxygen for comfort, have RT assess.  Ordered VBG    Comments:

## 2021-12-04 NOTE — PLAN OF CARE
DATE & TIME: 12/3/21    Cognitive Concerns/ Orientation : AOx4  BEHAVIOR & AGGRESSION TOOL COLOR: Green  CIWA SCORE: NA    ABNL VS/O2: O2 sats dip with exertion - ambulation to BSC - couple of steps. QUILES/SOB w/ talking  MOBILITY: IND  PAIN MANAGMENT: Tylenol   DIET: Reg  BOWEL/BLADDER: adequate I/Os  ABNL LAB/BG: CRP and WBC elevated   DRAIN/DEVICES: Cobntinuous pulseox  TELEMETRY RHYTHM: NA  SKIN: Intact  TESTS/PROCEDURES: NA  D/C DATE: Pending  OTHER IMPORTANT INFO: Continue to encourage pt to use I.S.  IS THE PATIENT ON REMDESIVIR? DATE OF LAST SCHEDULED DOSE yes, today 12/3/21

## 2021-12-04 NOTE — PLAN OF CARE
Summary: COVID+   DATE & TIME: 12/3/21-12/4/21 6034-6135  Cognitive Concerns/ Orientation : A&O x4   BEHAVIOR & AGGRESSION TOOL COLOR: Green  CIWA SCORE: N/A   ABNL VS/O2: VSS on 2L NC.   MOBILITY: SBA d/t QUILES, bedrest with commode overnight  PAIN MANAGMENT: Denies  DIET: Regular  BOWEL/BLADDER: Continent to BSC.   ABNL LAB/BG: N/A  DRAIN/DEVICES: PIV SL  TELEMETRY RHYTHM: N/A  SKIN: WNL  TESTS/PROCEDURES: N/A  D/C DAY/GOALS/PLACE: Discharge pending clinical improvement  OTHER IMPORTANT INFO: Special precautions maintained for COVID +; QUILES; frequent productive cough. Instructed and encouraged IS pt only able to get to 250. Pt on Remdesivir, lovenox and decadron protocol.

## 2021-12-05 LAB
ANION GAP SERPL CALCULATED.3IONS-SCNC: 5 MMOL/L (ref 3–14)
BUN SERPL-MCNC: 15 MG/DL (ref 7–30)
CALCIUM SERPL-MCNC: 8.9 MG/DL (ref 8.5–10.1)
CHLORIDE BLD-SCNC: 109 MMOL/L (ref 94–109)
CO2 SERPL-SCNC: 25 MMOL/L (ref 20–32)
CREAT SERPL-MCNC: 0.59 MG/DL (ref 0.52–1.04)
CRP SERPL-MCNC: 44.8 MG/L (ref 0–8)
D DIMER PPP FEU-MCNC: <0.27 UG/ML FEU (ref 0–0.5)
ERYTHROCYTE [DISTWIDTH] IN BLOOD BY AUTOMATED COUNT: 12.5 % (ref 10–15)
FIBRINOGEN PPP-MCNC: 572 MG/DL (ref 170–490)
GFR SERPL CREATININE-BSD FRML MDRD: >90 ML/MIN/1.73M2
GLUCOSE BLD-MCNC: 133 MG/DL (ref 70–99)
HCT VFR BLD AUTO: 37 % (ref 35–47)
HGB BLD-MCNC: 11.8 G/DL (ref 11.7–15.7)
MCH RBC QN AUTO: 28 PG (ref 26.5–33)
MCHC RBC AUTO-ENTMCNC: 31.9 G/DL (ref 31.5–36.5)
MCV RBC AUTO: 88 FL (ref 78–100)
PLATELET # BLD AUTO: 335 10E3/UL (ref 150–450)
POTASSIUM BLD-SCNC: 4.1 MMOL/L (ref 3.4–5.3)
RBC # BLD AUTO: 4.21 10E6/UL (ref 3.8–5.2)
SODIUM SERPL-SCNC: 139 MMOL/L (ref 133–144)
WBC # BLD AUTO: 10.2 10E3/UL (ref 4–11)

## 2021-12-05 PROCEDURE — 80048 BASIC METABOLIC PNL TOTAL CA: CPT | Performed by: INTERNAL MEDICINE

## 2021-12-05 PROCEDURE — 85384 FIBRINOGEN ACTIVITY: CPT | Performed by: INTERNAL MEDICINE

## 2021-12-05 PROCEDURE — 258N000003 HC RX IP 258 OP 636: Performed by: INTERNAL MEDICINE

## 2021-12-05 PROCEDURE — 250N000013 HC RX MED GY IP 250 OP 250 PS 637: Performed by: INTERNAL MEDICINE

## 2021-12-05 PROCEDURE — 250N000011 HC RX IP 250 OP 636: Performed by: INTERNAL MEDICINE

## 2021-12-05 PROCEDURE — 86140 C-REACTIVE PROTEIN: CPT | Performed by: INTERNAL MEDICINE

## 2021-12-05 PROCEDURE — 120N000001 HC R&B MED SURG/OB

## 2021-12-05 PROCEDURE — 99233 SBSQ HOSP IP/OBS HIGH 50: CPT | Performed by: INTERNAL MEDICINE

## 2021-12-05 PROCEDURE — 85379 FIBRIN DEGRADATION QUANT: CPT | Performed by: INTERNAL MEDICINE

## 2021-12-05 PROCEDURE — 36415 COLL VENOUS BLD VENIPUNCTURE: CPT | Performed by: INTERNAL MEDICINE

## 2021-12-05 PROCEDURE — 250N000009 HC RX 250: Performed by: INTERNAL MEDICINE

## 2021-12-05 PROCEDURE — 999N000157 HC STATISTIC RCP TIME EA 10 MIN

## 2021-12-05 PROCEDURE — 250N000013 HC RX MED GY IP 250 OP 250 PS 637: Performed by: STUDENT IN AN ORGANIZED HEALTH CARE EDUCATION/TRAINING PROGRAM

## 2021-12-05 PROCEDURE — 85027 COMPLETE CBC AUTOMATED: CPT | Performed by: INTERNAL MEDICINE

## 2021-12-05 PROCEDURE — 250N000012 HC RX MED GY IP 250 OP 636 PS 637: Performed by: INTERNAL MEDICINE

## 2021-12-05 RX ADMIN — GUAIFENESIN 10 ML: 200 SOLUTION ORAL at 08:32

## 2021-12-05 RX ADMIN — GUAIFENESIN 10 ML: 200 SOLUTION ORAL at 20:34

## 2021-12-05 RX ADMIN — SODIUM CHLORIDE 50 ML: 9 INJECTION, SOLUTION INTRAVENOUS at 20:13

## 2021-12-05 RX ADMIN — DEXAMETHASONE 6 MG: 2 TABLET ORAL at 12:36

## 2021-12-05 RX ADMIN — ENOXAPARIN SODIUM 40 MG: 40 INJECTION SUBCUTANEOUS at 08:32

## 2021-12-05 RX ADMIN — BARICITINIB 4 MG: 2 TABLET, FILM COATED ORAL at 08:32

## 2021-12-05 RX ADMIN — ENOXAPARIN SODIUM 40 MG: 40 INJECTION SUBCUTANEOUS at 20:22

## 2021-12-05 RX ADMIN — REMDESIVIR 100 MG: 100 INJECTION, POWDER, LYOPHILIZED, FOR SOLUTION INTRAVENOUS at 20:13

## 2021-12-05 RX ADMIN — GUAIFENESIN 10 ML: 200 SOLUTION ORAL at 12:36

## 2021-12-05 ASSESSMENT — ACTIVITIES OF DAILY LIVING (ADL)
ADLS_ACUITY_SCORE: 7

## 2021-12-05 NOTE — PROGRESS NOTES
[facility]    Medicine Progress Note - Hospitalist Service       Date of Admission:  12/1/2021    Assessment & Plan             Kim Guillory is a 36 year old unvaccinated female with no other significant past medical history admitted on 12/1/2021 with COVID-19 pneumonia     COVID-19 Diagnosis Details:  Onset of COVID symptoms 5 days ago   Date of positive test results 12/1   Vaccine status Unvaccinated, contemplative. Plans to be vaccinated as soon as cleared.       # Confirmed COVID-19 infection    # Acute Hypoxic Respiratory Failure secondary to COVID-19 infection, improving  Bilateral pneumonia 2/2 COVID-19  - Initial chest CT without contrast showed multifocal airspace disease.   - Admit to medical floor with continuous pulse oximetry, COVID-19 special precautions, minimized lab draws, and care consolidation  - Oxygen: Her oxygen needs went up from 12/3-12/4, she was on 5 L on admission, currently on 40 L of high flow O2, patient continues to be in critical condition.  - Labs: Covid labs reviewed, inflammatory markers are trending down but slowly, mild elevation in white count noted, possibly secondary to steroids.  - Imaging:  Repeat CT chest did not indicate any PE.  - Breathing treatments: albuterol inhaler four times a day scheduled and PRN; avoid nebulizers in favor of MDIs   - IV fluids: not indicated at this time  - Antibiotics: not indicated   - Treatments: Dexamethasone 6 mg x 10 days or until hospital discharge, started on 12/1 and Remdesivir x 5 days or until hospital discharge, started on 12/1, due to her increased oxygen needs, discussed about baricitinib to the patient, she agreed to proceed with treatment, started 12/4.  - DVT Prophylaxis: At high risk of thrombotic complications due to COVID-19 disease         - PROPHYLACTIC dosing: lovenox 40mg daily  - Discharge Anticoagulation: At discharge consider one of the following for 30 days and until the patient has  returned to normal mobility:        - Apixaban (Eliquis) 2.5 mg two times a day        - Rivaroxaban (Xarelto) 10 mg once daily    Since patient has ongoing significant dyspnea we'll continue remdesivir and wait till she finishes 5 days.    Class III obesity  - BMI 41.46  - increased risk of all cause mortality        Diet: Combination Diet Regular Diet Adult    DVT Prophylaxis: Enoxaparin (Lovenox) SQ  Rivera Catheter: Not present  Central Lines: None  Code Status: Full Code      Disposition Plan   Expected discharge: 12/09/2021   recommended to prior living arrangement once resp status improved.     The patient's care was discussed with the Bedside Nurse and Patient.    Dione Patel MD  Hospitalist Service  [facility]  Securely message with the Vocera Web Console (learn more here)  Text page via [employer] Paging[employer]        Clinically Significant Risk Factors Present on Admission                Time Spent on this Encounter   I spent 35 minutes on the unit/floor managing the care of Kim Guillory. Over 50% of my time was spent on the following:   - Counseling the patient and/or family regarding: diagnostic results, prognosis, risks and benefits of treatment options and recommended follow-up  - Coordination of care with the: nurse    Dione Patel MD    ______________________________________________________________________    Interval History   Continue to be hypoxic, very deconditioned, her oxygen drops with minimal movement, recommended side sleeping and try proning as possible.  Patient mentions that she is comfortable on 40 L of oxygen.  Data reviewed today: I reviewed all medications, new labs and imaging results over the last 24 hours. I personally reviewed no images or EKG's today.    Physical Exam   Vital Signs: Temp: 97.9  F (36.6  C) Temp src: Oral BP: 125/80 Pulse: 59   Resp: 22 SpO2: 98 % O2 Device: High Flow Nasal Cannula (HFNC) Oxygen Delivery: 30 LPM  Weight: 249 lbs  1.92 oz  Constitutional: Awake, alert, cooperative, no apparent distress, morbidly obese  Respiratory: Breath sounds reduced bilateral bases  Cardiovascular: Regular rate and rhythm, normal S1 and S2, and no murmur noted  GI: Normal bowel sounds, soft, non-distended, non-tender  Skin/Integumen: No rashes, no cyanosis, no edema  Neuro : moving all 4 extremities, no focal deficit noted         Data   Recent Labs   Lab 12/05/21  0738 12/04/21  0746 12/03/21  0913 12/02/21  0614 12/01/21  1917 12/01/21  1840 12/01/21  0038   WBC 10.2 10.8 11.6*   < >  --    < > 6.1   HGB 11.8 11.4* 11.4*   < >  --    < > 11.9   MCV 88 87 87   < >  --    < > 88    342 270   < >  --    < > 191    138 140   < > 135  --  132*   POTASSIUM 4.1 3.8 3.8   < > 3.6  --  4.2   CHLORIDE 109 106 106   < > 104  --  101   CO2 25 26 27   < > 25  --  26   BUN 15 12 10   < > 5*  --  5*   CR 0.59 0.64 0.70   < > 0.72  --  0.79   ANIONGAP 5 6 7   < > 6  --  5   ANASTCAIA 8.9 8.4* 8.6   < > 7.9*  --  8.0*   * 146* 148*   < > 115*  --  117*   ALBUMIN  --   --   --   --  2.5*  --  2.6*   PROTTOTAL  --   --   --   --  7.6  --  7.8   BILITOTAL  --   --   --   --  0.4  --  0.4   ALKPHOS  --   --   --   --  40  --  42   ALT  --   --   --   --  22  --  25   AST  --   --   --   --  21  --  22   TROPONIN  --   --   --   --   --   --  <0.015    < > = values in this interval not displayed.     No results found for this or any previous visit (from the past 24 hour(s)).  Medications     - MEDICATION INSTRUCTIONS -         remdesivir  100 mg Intravenous Q24H    And     sodium chloride 0.9%  50 mL Intravenous Q24H     baricitinib  4 mg Oral Daily     dexamethasone  6 mg Oral Daily     enoxaparin ANTICOAGULANT  40 mg Subcutaneous Q12H     sodium chloride (PF)  3 mL Intracatheter Q8H

## 2021-12-05 NOTE — PLAN OF CARE
Summary: COVID+   DATE & TIME: 12/4/21 7072-0491  Cognitive Concerns/ Orientation : A&O x4   BEHAVIOR & AGGRESSION TOOL COLOR: Green  CIWA SCORE: N/A   ABNL VS/O2: RR upper 30-40 this morning, was maintaining sats mid 90's on 2L but placed on High flow 30L/35FiO2 due to RR and increased to 30L/45FiO2 later this afternoon due to increase in respiratory rate.   MOBILITY: Extreme SOB and increased RR with getting up to commode.  Purewick to be used until pt tolerates activity.  PAIN MANAGMENT: Denies  DIET: Regular-poor appetite  BOWEL/BLADDER: Continent.   ABNL LAB/BG: CRP 82.7, Fibrinogen 687, PCO2 40, PO2 56  DRAIN/DEVICES: PIV SL  TELEMETRY RHYTHM: N/A  SKIN: WNL  TESTS/PROCEDURES: N/A  D/C DAY/GOALS/PLACE: Discharge pending clinical improvement  OTHER IMPORTANT INFO: Started baricitinib today in addition to Remdesivir.  On decadron and lovenox.  Shallow breathing, increased cough with deep breath.  Encouraged to lay on side or try to prone if able.  Cough productive at times.

## 2021-12-05 NOTE — PLAN OF CARE
Summary: COVID+   DATE & TIME: 12/4/21-12/5/21 7066-6372  Cognitive Concerns/ Orientation : A&O x4   BEHAVIOR & AGGRESSION TOOL COLOR: Green  CIWA SCORE: N/A   ABNL VS/O2: VSS on HFNC 30L at 45% fiO2. RR: mid 20s.  MOBILITY: Extreme SOB and increased RR with getting up to commode. Purewick to be used until pt tolerates activity.  PAIN MANAGMENT: Denies  DIET: Regular  BOWEL/BLADDER: Continent, purewick in place until able to better tolerate activity.   ABNL LAB/BG: N/A  DRAIN/DEVICES: PIV SL  TELEMETRY RHYTHM: N/A  SKIN: WNL  TESTS/PROCEDURES: N/A  D/C DAY/GOALS/PLACE: Discharge pending clinical improvement  OTHER IMPORTANT INFO: Started baricitinib 12/4 in addition to Remdesivir.  On decadron and lovenox.  Shallow breathing, increased cough with deep breath.  Encouraged to lay on side or try to prone if able.  Cough productive at times.

## 2021-12-05 NOTE — PLAN OF CARE
DATE & TIME: 12/4 1900-2300   Cognitive Concerns/ Orientation : A&O x4  BEHAVIOR & AGGRESSION TOOL COLOR: green  CIWA SCORE: NA  ABNL VS/O2: High flow FiO2 45%, 30L  MOBILITY: A1, dyspnea with exertion  PAIN MANAGMENT: denies  DIET: regular  BOWEL/BLADDER: purewick in place  ABNL LAB/BG: NA  DRAIN/DEVICES: purewick, HFNC  TELEMETRY RHYTHM: NA  SKIN: NA  TESTS/PROCEDURES: NA  D/C DATE: pending O2 needs  Discharge Barriers: high flow O2  OTHER IMPORTANT INFO: denies shortness of breath at rest, resp 24, states breathing is easier since being on HFNC.   MD/RN ROUNDING SIGNED OFF D/E SHIFT: NA  COMMIT TO SIT DONE AND SIGNED OFF: NA  IS THE PATIENT ON REMDESIVIR? DATE OF LAST SCHEDULED DOSE: yes. 12/4 at 2030

## 2021-12-05 NOTE — PROGRESS NOTES
RT assessed pt due to increase in RR to mid 30's, increased FiO2 to 45% (had been 35%), 30L O2  on High Flow nasal canula.  Pt continues to maintain oxygen levels in high 90's. Will continue to monitor.

## 2021-12-06 LAB
ANION GAP SERPL CALCULATED.3IONS-SCNC: 3 MMOL/L (ref 3–14)
BUN SERPL-MCNC: 16 MG/DL (ref 7–30)
CALCIUM SERPL-MCNC: 8.9 MG/DL (ref 8.5–10.1)
CHLORIDE BLD-SCNC: 106 MMOL/L (ref 94–109)
CO2 SERPL-SCNC: 29 MMOL/L (ref 20–32)
CREAT SERPL-MCNC: 0.6 MG/DL (ref 0.52–1.04)
CRP SERPL-MCNC: 22 MG/L (ref 0–8)
D DIMER PPP FEU-MCNC: <0.27 UG/ML FEU (ref 0–0.5)
ERYTHROCYTE [DISTWIDTH] IN BLOOD BY AUTOMATED COUNT: 12.4 % (ref 10–15)
FIBRINOGEN PPP-MCNC: 729 MG/DL (ref 170–490)
GFR SERPL CREATININE-BSD FRML MDRD: >90 ML/MIN/1.73M2
GLUCOSE BLD-MCNC: 124 MG/DL (ref 70–99)
HCT VFR BLD AUTO: 37.1 % (ref 35–47)
HGB BLD-MCNC: 11.9 G/DL (ref 11.7–15.7)
MCH RBC QN AUTO: 28.1 PG (ref 26.5–33)
MCHC RBC AUTO-ENTMCNC: 32.1 G/DL (ref 31.5–36.5)
MCV RBC AUTO: 88 FL (ref 78–100)
PLATELET # BLD AUTO: 433 10E3/UL (ref 150–450)
POTASSIUM BLD-SCNC: 4.2 MMOL/L (ref 3.4–5.3)
RBC # BLD AUTO: 4.23 10E6/UL (ref 3.8–5.2)
SODIUM SERPL-SCNC: 138 MMOL/L (ref 133–144)
WBC # BLD AUTO: 12.2 10E3/UL (ref 4–11)

## 2021-12-06 PROCEDURE — 86140 C-REACTIVE PROTEIN: CPT | Performed by: INTERNAL MEDICINE

## 2021-12-06 PROCEDURE — 80048 BASIC METABOLIC PNL TOTAL CA: CPT | Performed by: INTERNAL MEDICINE

## 2021-12-06 PROCEDURE — 120N000001 HC R&B MED SURG/OB

## 2021-12-06 PROCEDURE — 99233 SBSQ HOSP IP/OBS HIGH 50: CPT | Performed by: INTERNAL MEDICINE

## 2021-12-06 PROCEDURE — 250N000013 HC RX MED GY IP 250 OP 250 PS 637: Performed by: INTERNAL MEDICINE

## 2021-12-06 PROCEDURE — 85379 FIBRIN DEGRADATION QUANT: CPT | Performed by: INTERNAL MEDICINE

## 2021-12-06 PROCEDURE — 36415 COLL VENOUS BLD VENIPUNCTURE: CPT | Performed by: INTERNAL MEDICINE

## 2021-12-06 PROCEDURE — 85027 COMPLETE CBC AUTOMATED: CPT | Performed by: INTERNAL MEDICINE

## 2021-12-06 PROCEDURE — 250N000011 HC RX IP 250 OP 636: Performed by: INTERNAL MEDICINE

## 2021-12-06 PROCEDURE — 85384 FIBRINOGEN ACTIVITY: CPT | Performed by: INTERNAL MEDICINE

## 2021-12-06 PROCEDURE — 250N000012 HC RX MED GY IP 250 OP 636 PS 637: Performed by: INTERNAL MEDICINE

## 2021-12-06 RX ADMIN — ENOXAPARIN SODIUM 40 MG: 40 INJECTION SUBCUTANEOUS at 20:11

## 2021-12-06 RX ADMIN — ENOXAPARIN SODIUM 40 MG: 40 INJECTION SUBCUTANEOUS at 08:56

## 2021-12-06 RX ADMIN — DEXAMETHASONE 6 MG: 2 TABLET ORAL at 13:28

## 2021-12-06 RX ADMIN — BARICITINIB 4 MG: 2 TABLET, FILM COATED ORAL at 08:56

## 2021-12-06 ASSESSMENT — ACTIVITIES OF DAILY LIVING (ADL)
ADLS_ACUITY_SCORE: 7
ADLS_ACUITY_SCORE: 9
ADLS_ACUITY_SCORE: 7
ADLS_ACUITY_SCORE: 9
ADLS_ACUITY_SCORE: 9
ADLS_ACUITY_SCORE: 7
ADLS_ACUITY_SCORE: 9
ADLS_ACUITY_SCORE: 7
ADLS_ACUITY_SCORE: 7
ADLS_ACUITY_SCORE: 5
ADLS_ACUITY_SCORE: 7
ADLS_ACUITY_SCORE: 9
ADLS_ACUITY_SCORE: 9
ADLS_ACUITY_SCORE: 7
ADLS_ACUITY_SCORE: 9
ADLS_ACUITY_SCORE: 9
ADLS_ACUITY_SCORE: 7
ADLS_ACUITY_SCORE: 5
ADLS_ACUITY_SCORE: 7

## 2021-12-06 NOTE — PROGRESS NOTES
Oxygen Saturation: 98%  Pulse: 61    08:05 Pt weaned off HFNC to 5L NC. RT to continue follow the pt.

## 2021-12-06 NOTE — PLAN OF CARE
Summary: COVID+   DATE & TIME: 12/5/21 1830-2176  Cognitive Concerns/ Orientation : A&O x4   BEHAVIOR & AGGRESSION TOOL COLOR: Green  CIWA SCORE: N/A   ABNL VS/O2: VSS on HFNC 30L at 40% fiO2. RR: mid 20s.  MOBILITY: Able to tolerated up to commode with SBA today,   PAIN MANAGMENT: Denies  DIET: Regular  BOWEL/BLADDER: Continent  ABNL LAB/BG: N/A  DRAIN/DEVICES: PIV SL  TELEMETRY RHYTHM: N/A  SKIN: WNL  TESTS/PROCEDURES: N/A  D/C DAY/GOALS/PLACE: Discharge pending clinical improvement  OTHER IMPORTANT INFO: Started baricitinib 12/4 in addition to Remdesivir.  On decadron and lovenox.  Robitussin given x2, able to take slightly deeper breaths today, trying to use IS-got to 500.  Laying on side in bed.  QUILES but improved from yesterday.

## 2021-12-06 NOTE — PLAN OF CARE
Summary: COVID+   DATE & TIME: 12/5/21-12/6/21 3707-8856  Cognitive Concerns/ Orientation : A&O x4   BEHAVIOR & AGGRESSION TOOL COLOR: Green  CIWA SCORE: N/A   ABNL VS/O2: VSS on HFNC 30L at 40% fiO2. RR: mid 20s.  MOBILITY: Able to tolerated up to commode with SBA    PAIN MANAGMENT: Denies  DIET: Regular  BOWEL/BLADDER: Continent  ABNL LAB/BG: N/A  DRAIN/DEVICES: PIV SL  TELEMETRY RHYTHM: N/A  SKIN: WNL  TESTS/PROCEDURES: N/A  D/C DAY/GOALS/PLACE: Discharge pending clinical improvement  OTHER IMPORTANT INFO: Started baricitinib 12/4 in addition to Remdesivir.  On decadron and lovenox.  Robitussin given x1, able to take slightly deeper breaths today, trying to use IS-got to 500 during day. QUILES but improved from yesterday.

## 2021-12-06 NOTE — PROGRESS NOTES
[facility]    Medicine Progress Note - Hospitalist Service       Date of Admission:  12/1/2021    Assessment & Plan             Kim Guillory is a 36 year old unvaccinated female with no other significant past medical history admitted on 12/1/2021 with COVID-19 pneumonia     COVID-19 Diagnosis Details:  Onset of COVID symptoms 5 days ago   Date of positive test results 12/1   Vaccine status Unvaccinated, contemplative. Plans to be vaccinated as soon as cleared.       # Confirmed COVID-19 infection    # Acute Hypoxic Respiratory Failure secondary to COVID-19 infection, improving  Bilateral pneumonia 2/2 COVID-19  - Initial chest CT without contrast showed multifocal airspace disease.   - Admit to medical floor with continuous pulse oximetry, COVID-19 special precautions, minimized lab draws, and care consolidation  - Oxygen: Her oxygen needs went up from 12/3-12/4, she was on 5 L on admission, was able to wean her oxygen back down to 5 L now, continue to wean it down, her condition continued to remain critical.  - Labs: Covid labs reviewed, inflammatory markers are trending down but slowly, mild elevation in white count noted, possibly secondary to steroids.  - Imaging:  Repeat CT chest did not indicate any PE.  - Breathing treatments: albuterol inhaler four times a day scheduled and PRN; avoid nebulizers in favor of MDIs   - IV fluids: not indicated at this time  - Antibiotics: not indicated   - Treatments: Dexamethasone 6 mg x 10 days or until hospital discharge, started on 12/1 and Remdesivir x 5 days or until hospital discharge, started on 12/1, due to her increased oxygen needs, discussed about baricitinib to the patient, she agreed to proceed with treatment, started 12/4, continue till discharge.  - DVT Prophylaxis: At high risk of thrombotic complications due to COVID-19 disease         - PROPHYLACTIC dosing: lovenox 40mg daily  - Discharge Anticoagulation: At discharge consider  one of the following for 30 days and until the patient has returned to normal mobility:        - Apixaban (Eliquis) 2.5 mg two times a day        - Rivaroxaban (Xarelto) 10 mg once daily    Since patient has ongoing significant dyspnea we'll continue remdesivir and wait till she finishes 5 days.    Class III obesity  - BMI 41.46  - increased risk of all cause mortality        Diet: Combination Diet Regular Diet Adult    DVT Prophylaxis: Enoxaparin (Lovenox) SQ  Rivera Catheter: Not present  Central Lines: None  Code Status: Full Code      Disposition Plan   Expected discharge: 12/09/2021   recommended to prior living arrangement once resp status improved.     The patient's care was discussed with the Bedside Nurse and Patient.    Dione Patel MD  Hospitalist Service  [facility]  Securely message with the Vocera Web Console (learn more here)  Text page via [employer] Paging/Directory        Clinically Significant Risk Factors Present on Admission                 Time Spent on this Encounter   I spent 35 minutes on the unit/floor managing the care of Kim Guillory. Over 50% of my time was spent on the following:   - Counseling the patient and/or family regarding: diagnostic results, prognosis, risks and benefits of treatment options and recommended follow-up  - Coordination of care with the: nurse    Dione Patel MD    ______________________________________________________________________    Interval History   Hypoxia is improved significantly, still requiring 5 L of oxygen, she gets quite deconditioned with minimal activity, discussed about possible discharge plans once we can get oxygen below at 3-4 L of oxygen needs.  Tolerating diet well.  Data reviewed today: I reviewed all medications, new labs and imaging results over the last 24 hours. I personally reviewed no images or EKG's today.    Physical Exam   Vital Signs: Temp: 97.7  F (36.5  C) Temp src: Oral BP: 102/64 Pulse: 59   Resp:  20 SpO2: 100 % O2 Device: Nasal cannula Oxygen Delivery: 5 LPM  Weight: 249 lbs 1.92 oz  Constitutional: Awake, alert, cooperative, no apparent distress, morbidly obese  Respiratory: Breath sounds reduced bilateral bases  Cardiovascular: Regular rate and rhythm, normal S1 and S2, and no murmur noted  GI: Normal bowel sounds, soft, non-distended, non-tender  Skin/Integumen: No rashes, no cyanosis, no edema  Neuro : moving all 4 extremities, no focal deficit noted         Data   Recent Labs   Lab 12/06/21  0848 12/05/21  0738 12/04/21  0746 12/02/21  0614 12/01/21  1917 12/01/21  1840 12/01/21  0038   WBC 12.2* 10.2 10.8   < >  --    < > 6.1   HGB 11.9 11.8 11.4*   < >  --    < > 11.9   MCV 88 88 87   < >  --    < > 88    335 342   < >  --    < > 191    139 138   < > 135  --  132*   POTASSIUM 4.2 4.1 3.8   < > 3.6  --  4.2   CHLORIDE 106 109 106   < > 104  --  101   CO2 29 25 26   < > 25  --  26   BUN 16 15 12   < > 5*  --  5*   CR 0.60 0.59 0.64   < > 0.72  --  0.79   ANIONGAP 3 5 6   < > 6  --  5   ANASTACIA 8.9 8.9 8.4*   < > 7.9*  --  8.0*   * 133* 146*   < > 115*  --  117*   ALBUMIN  --   --   --   --  2.5*  --  2.6*   PROTTOTAL  --   --   --   --  7.6  --  7.8   BILITOTAL  --   --   --   --  0.4  --  0.4   ALKPHOS  --   --   --   --  40  --  42   ALT  --   --   --   --  22  --  25   AST  --   --   --   --  21  --  22   TROPONIN  --   --   --   --   --   --  <0.015    < > = values in this interval not displayed.     No results found for this or any previous visit (from the past 24 hour(s)).  Medications     - MEDICATION INSTRUCTIONS -         sodium chloride 0.9%  50 mL Intravenous Q24H     baricitinib  4 mg Oral Daily     dexamethasone  6 mg Oral Daily     enoxaparin ANTICOAGULANT  40 mg Subcutaneous Q12H     sodium chloride (PF)  3 mL Intracatheter Q8H

## 2021-12-07 LAB
ANION GAP SERPL CALCULATED.3IONS-SCNC: 7 MMOL/L (ref 3–14)
BUN SERPL-MCNC: 17 MG/DL (ref 7–30)
CALCIUM SERPL-MCNC: 8.9 MG/DL (ref 8.5–10.1)
CHLORIDE BLD-SCNC: 106 MMOL/L (ref 94–109)
CO2 SERPL-SCNC: 25 MMOL/L (ref 20–32)
CREAT SERPL-MCNC: 0.66 MG/DL (ref 0.52–1.04)
CRP SERPL-MCNC: 12.9 MG/L (ref 0–8)
D DIMER PPP FEU-MCNC: 1.38 UG/ML FEU (ref 0–0.5)
ERYTHROCYTE [DISTWIDTH] IN BLOOD BY AUTOMATED COUNT: 12.6 % (ref 10–15)
FIBRINOGEN PPP-MCNC: 546 MG/DL (ref 170–490)
GFR SERPL CREATININE-BSD FRML MDRD: >90 ML/MIN/1.73M2
GLUCOSE BLD-MCNC: 184 MG/DL (ref 70–99)
HCT VFR BLD AUTO: 38.3 % (ref 35–47)
HGB BLD-MCNC: 12.5 G/DL (ref 11.7–15.7)
MCH RBC QN AUTO: 28.5 PG (ref 26.5–33)
MCHC RBC AUTO-ENTMCNC: 32.6 G/DL (ref 31.5–36.5)
MCV RBC AUTO: 87 FL (ref 78–100)
PLATELET # BLD AUTO: 453 10E3/UL (ref 150–450)
POTASSIUM BLD-SCNC: 4 MMOL/L (ref 3.4–5.3)
RBC # BLD AUTO: 4.38 10E6/UL (ref 3.8–5.2)
SODIUM SERPL-SCNC: 138 MMOL/L (ref 133–144)
WBC # BLD AUTO: 15.9 10E3/UL (ref 4–11)

## 2021-12-07 PROCEDURE — 85384 FIBRINOGEN ACTIVITY: CPT | Performed by: INTERNAL MEDICINE

## 2021-12-07 PROCEDURE — 85027 COMPLETE CBC AUTOMATED: CPT | Performed by: INTERNAL MEDICINE

## 2021-12-07 PROCEDURE — 250N000013 HC RX MED GY IP 250 OP 250 PS 637: Performed by: INTERNAL MEDICINE

## 2021-12-07 PROCEDURE — 250N000011 HC RX IP 250 OP 636: Performed by: INTERNAL MEDICINE

## 2021-12-07 PROCEDURE — 86140 C-REACTIVE PROTEIN: CPT | Performed by: INTERNAL MEDICINE

## 2021-12-07 PROCEDURE — 36415 COLL VENOUS BLD VENIPUNCTURE: CPT | Performed by: INTERNAL MEDICINE

## 2021-12-07 PROCEDURE — 99233 SBSQ HOSP IP/OBS HIGH 50: CPT | Performed by: INTERNAL MEDICINE

## 2021-12-07 PROCEDURE — 80048 BASIC METABOLIC PNL TOTAL CA: CPT | Performed by: INTERNAL MEDICINE

## 2021-12-07 PROCEDURE — 250N000012 HC RX MED GY IP 250 OP 636 PS 637: Performed by: INTERNAL MEDICINE

## 2021-12-07 PROCEDURE — 85379 FIBRIN DEGRADATION QUANT: CPT | Performed by: INTERNAL MEDICINE

## 2021-12-07 PROCEDURE — 120N000001 HC R&B MED SURG/OB

## 2021-12-07 RX ADMIN — ENOXAPARIN SODIUM 40 MG: 40 INJECTION SUBCUTANEOUS at 09:35

## 2021-12-07 RX ADMIN — ENOXAPARIN SODIUM 40 MG: 40 INJECTION SUBCUTANEOUS at 21:15

## 2021-12-07 RX ADMIN — DEXAMETHASONE 6 MG: 2 TABLET ORAL at 12:39

## 2021-12-07 RX ADMIN — BARICITINIB 4 MG: 2 TABLET, FILM COATED ORAL at 09:35

## 2021-12-07 ASSESSMENT — ACTIVITIES OF DAILY LIVING (ADL)
ADLS_ACUITY_SCORE: 5
ADLS_ACUITY_SCORE: 6
ADLS_ACUITY_SCORE: 5
ADLS_ACUITY_SCORE: 6
ADLS_ACUITY_SCORE: 5
ADLS_ACUITY_SCORE: 6
ADLS_ACUITY_SCORE: 5

## 2021-12-07 NOTE — PLAN OF CARE
"Cognitive Concerns/ Orientation: A&O x4   BEHAVIOR & AGGRESSION TOOL COLOR: Green  CIWA SCORE: N/A   ABNL VS/O2: VSS. Afebrile, weaned to 3 L NC.  MOBILITY: Able to tolerated up to commode with SBA    PAIN MANAGMENT: Denies  DIET: Regular-good appetite  BOWEL/BLADDER: Continent  ABNL LAB/BG: N/A  DRAIN/DEVICES: PIV SL  TELEMETRY RHYTHM: N/A  SKIN: WNL  TESTS/PROCEDURES: N/A  D/C DAY/GOALS/PLACE: Discharge pending clinical improvement  OTHER IMPORTANT INFO: Receiving baricitinib, Remdesivir, decadron and lovenox. Productive cough with thin secretions. Diminished lung sounds. Stated 'I'm breathing so much better\". Resting comfortably. Sat in a chair and took a shower.   "

## 2021-12-07 NOTE — PROGRESS NOTES
CLINICAL NUTRITION SERVICES - REASSESSMENT NOTE    Malnutrition: (12/7)   % Weight Loss:  Up to 5% in 1 month (moderate malnutrition)  % Intake:  <75% for > 7 days (moderate malnutrition)  Subcutaneous Fat Loss:  Deferred  Muscle Loss:  Deferred  Fluid Retention:  None noted    Malnutrition Diagnosis: Moderate malnutrition  In Context of:  Acute illness or injury     EVALUATION OF PROGRESS TOWARD GOALS   Diet: Regular diet   Intake/Tolerance: tolerating 25-75% of meals, ordering 2-3 trays daily.   - Spoke to pt over the phone this morning, she is feeling much better and has gotten her sense of taste and smell back. This has helped her appetite quite a bit and she is no longer struggling to eat.  - Ordered an adequate breakfast this morning, which she had just received prior to our call. She feels confident in her ability to eat most if not all of this meal.     - Did report up to a 12# weight loss since the beginning of her illness.   - Stooling x1 on 12/6    ASSESSED NUTRITION NEEDS:  Dosing Weight 71 kg - adjusted   Estimated Energy Needs: 8499-4470 kcals (25-30 Kcal/Kg)  Justification: maintenance  Estimated Protein Needs:  grams protein (1.2-1.5 g pro/Kg)  Justification: maintenance    Previous Goals:   Intake of at least 75% meals TID  Evaluation: Not met consistently     Previous Nutrition Diagnosis:   Predicted inadequate nutrient intake related to potential for poor appetite w/ COVID and hospitalization   Evaluation: Declining    MALNUTRITION  % Weight Loss:  Up to 5% in 1 month (moderate malnutrition)  % Intake:  <75% for > 7 days (moderate malnutrition)  Subcutaneous Fat Loss:  Deferred  Muscle Loss:  Deferred  Fluid Retention:  None noted    Malnutrition Diagnosis: Moderate malnutrition  In Context of:  Acute illness or injury    CURRENT NUTRITION DIAGNOSIS  Inadequate oral intake related to acute covid infection causing reduced appetite, taste/smell changes as evidenced by pt reported 12# loss and  intakes ranging 25-75% of meals since admission 6 days ago.     INTERVENTIONS  Recommendations / Nutrition Prescription  Continue regular diet - pt reports she is beginning to improve today      Implementation  None new.     Goals  Intake of >50% meals TID.     MONITORING AND EVALUATION:  Progress towards goals will be monitored and evaluated per protocol and Practice Guidelines    Zuly Ferraro RD, LD  [facility] [place], [facility]  Pager: [phone]  Weekend Pager: [phone]

## 2021-12-07 NOTE — PLAN OF CARE
"Cognitive Concerns/ Orientation: Alert and oriented x 4   BEHAVIOR & AGGRESSION TOOL COLOR: Green  CIWA SCORE: N/A   ABNL VS/O2: VSS on 3 lpm NC during the NOC sats between 95%-99%.  Patient has been removing oxygen at times and sats between 94%-96% room air  MOBILITY: Independent  PAIN MANAGMENT: Denies  DIET: Regular-good appetite  BOWEL/BLADDER: Continent  ABNL LAB/BG: CRP 22.0, Fibrinogen 729  DRAIN/DEVICES: Peripheral IV SL right arm   TELEMETRY RHYTHM: N/A  SKIN: WNL  TESTS/PROCEDURES: N/A  D/C DAY/GOALS/PLACE: Discharge pending clinical improvement  OTHER IMPORTANT INFO: Started baricitinib 12/4 in addition to Remdesivir.  On decadron and lovenox. Productive cough with thin secretions. Diminished lung sounds. Stated 'I'm breathing so much better\".   "

## 2021-12-07 NOTE — PROGRESS NOTES
[facility]    Medicine Progress Note - Hospitalist Service       Date of Admission:  12/1/2021    Assessment & Plan          Kim Guillory is a 36 year old unvaccinated female with no other significant past medical history admitted on 12/1/2021 with COVID-19 pneumonia     COVID-19 Diagnosis Details:  Onset of COVID symptoms 5 days ago   Date of positive test results 12/1, symptoms started 1 week prior to the positive test result.   Vaccine status Unvaccinated, contemplative. Plans to be vaccinated as soon as cleared.       # Confirmed COVID-19 infection    # Acute Hypoxic Respiratory Failure secondary to COVID-19 infection, improving  Bilateral pneumonia 2/2 COVID-19  - Initial chest CT without contrast showed multifocal airspace disease.   - Admit to medical floor with continuous pulse oximetry, COVID-19 special precautions, minimized lab draws, and care consolidation  - Oxygen: Her oxygen needs went up from 12/3-12/4, she was on 5 L on admission, then oxygen needs went up to high flow 45 L/min, was able to wean her oxygen back down to 5 L now, today patient is resting at 1 L, discussed about going home on oxygen versus rechecking tomorrow and possibly going home without oxygen.  Please do ambulatory pulse oximetry, either way patient can be discharged tomorrow.  - Labs: Covid labs reviewed, inflammatory markers are trending down but slowly, mild elevation in white count noted, possibly secondary to steroids.  - Imaging:  Repeat CT chest did not indicate any PE.  - Breathing treatments: albuterol inhaler four times a day scheduled and PRN; avoid nebulizers in favor of MDIs   - IV fluids: not indicated at this time  - Antibiotics: not indicated   - Treatments: Dexamethasone 6 mg x 10 days or until hospital discharge, started on 12/1 and Remdesivir x 5 days or until hospital discharge, started on 12/1, due to her increased oxygen needs, discussed about baricitinib to the patient, she  agreed to proceed with treatment, started 12/4, continue till discharge.  She has finished remdesivir, baricitinib can be discontinued on discharge, continue dexamethasone to finish 10-day course.  - DVT Prophylaxis: At high risk of thrombotic complications due to COVID-19 disease         - PROPHYLACTIC dosing: lovenox 40mg daily  - Discharge Anticoagulation: At discharge consider one of the following for 30 days and until the patient has returned to normal mobility:        - Apixaban (Eliquis) 2.5 mg two times a day        - Rivaroxaban (Xarelto) 10 mg once daily      Class III obesity  - BMI 41.46  - increased risk of all cause mortality        Diet: Combination Diet Regular Diet Adult    DVT Prophylaxis: Enoxaparin (Lovenox) SQ  Rivera Catheter: Not present  Central Lines: None  Code Status: Full Code      Disposition Plan   Expected discharge: 12/08/2021   recommended to prior living arrangement once resp status improved.  Please do ambulatory pulse oximetry , patient wants to go home tomorrow, decision is to be made whether patient needs oxygen to go home versus not, possibility is there she may not need oxygen since she is resting comfortably on 1 L now.     The patient's care was discussed with the Bedside Nurse and Patient.    Dione Patel MD  Hospitalist Service  [facility]  Securely message with the Vocera Web Console (learn more here)  Text page via [email or web address] Paging/Directory        Clinically Significant Risk Factors Present on Admission                 Time Spent on this Encounter   I spent 35 minutes on the unit/floor managing the care of Kim Guillory. Over 50% of my time was spent on the following:   - Counseling the patient and/or family regarding: diagnostic results, prognosis, risks and benefits of treatment options and recommended follow-up  - Coordination of care with the: nurse Dione Patel,  MD    ______________________________________________________________________    Interval History   Hypoxia has improved significantly, currently on 1 L oxygen, very had a long discussion about vaccination, discharge plans, going home with oxygen today versus checking to see if she would not need oxygen tomorrow, patient is deconditioned, so I did discuss the possibility of needing oxygen tomorrow, she would like to go home tomorrow even if she needs oxygen and have O2 at home.  Data reviewed today: I reviewed all medications, new labs and imaging results over the last 24 hours. I personally reviewed no images or EKG's today.    Physical Exam   Vital Signs: Temp: 98.1  F (36.7  C) Temp src: Oral BP: 103/60 Pulse: 72   Resp: 16 SpO2: 95 % O2 Device: Nasal cannula Oxygen Delivery: 1 LPM  Weight: 249 lbs 1.92 oz  Constitutional: Awake, alert, cooperative, no apparent distress, morbidly obese  Respiratory: Breath sounds reduced bilateral bases  Cardiovascular: Regular rate and rhythm, normal S1 and S2, and no murmur noted  GI: Normal bowel sounds, soft, non-distended, non-tender  Skin/Integumen: No rashes, no cyanosis, no edema  Neuro : moving all 4 extremities, no focal deficit noted         Data   Recent Labs   Lab 12/07/21  1117 12/06/21  0848 12/05/21  0738 12/04/21  0746 12/02/21  0614 12/01/21  1917 12/01/21  1840 12/01/21  0038   WBC 15.9* 12.2* 10.2 10.8   < >  --    < > 6.1   HGB 12.5 11.9 11.8 11.4*   < >  --    < > 11.9   MCV 87 88 88 87   < >  --    < > 88   * 433 335 342   < >  --    < > 191    138 139 138   < > 135  --  132*   POTASSIUM 4.0 4.2 4.1 3.8   < > 3.6  --  4.2   CHLORIDE 106 106 109 106   < > 104  --  101   CO2  --  29 25 26   < > 25  --  26   BUN  --  16 15 12   < > 5*  --  5*   CR  --  0.60 0.59 0.64   < > 0.72  --  0.79   ANIONGAP  --  3 5 6   < > 6  --  5   ANASTACIA  --  8.9 8.9 8.4*   < > 7.9*  --  8.0*   GLC  --  124* 133* 146*   < > 115*  --  117*   ALBUMIN  --   --   --   --   --   2.5*  --  2.6*   PROTTOTAL  --   --   --   --   --  7.6  --  7.8   BILITOTAL  --   --   --   --   --  0.4  --  0.4   ALKPHOS  --   --   --   --   --  40  --  42   ALT  --   --   --   --   --  22  --  25   AST  --   --   --   --   --  21  --  22   TROPONIN  --   --   --   --   --   --   --  <0.015    < > = values in this interval not displayed.     No results found for this or any previous visit (from the past 24 hour(s)).  Medications     - MEDICATION INSTRUCTIONS -         baricitinib  4 mg Oral Daily     dexamethasone  6 mg Oral Daily     enoxaparin ANTICOAGULANT  40 mg Subcutaneous Q12H     sodium chloride (PF)  3 mL Intracatheter Q8H

## 2021-12-07 NOTE — PROGRESS NOTES
"SPIRITUAL HEALTH SERVICES: Tele-Encounter  Patient Location (Hospital, Bank, Unit): [facility]  Spoke with (patient, family relationship): Patient      Referral Source: -initiated visited to assess emotional and spiritual needs in light of length of hospital stay.      If applicable: patient was appropriately screened for telechaplaincy support with bedside nurse prior to visit (e.g. Mental Health and Addiction contexts). See call details below.    DATA:  Brief tele-vist with Kim, who shared that she is feeling \"much better\" and feeling optimistic about discharge soon. I validated these positive feelings and passed along my well-wishes.    I provided empathetic listening       PLAN:  Spiritual Health Services remains available for patient, family, and staff support.     Please page the on call  by placing a STAT or ASAP Epic referral for Spiritual Health (which will roll to the on call pager).        FLIP Oliveira    ______________________________    Type of service:  Telephone Visit     has received verbal consent for a TelephoneVisit from the patient? Yes    Distance Provider Location: designated [place] office or home office (secure setting)    Mode of Communication: telephone (via [employer] phone or [employer] tele-call-number ([phone]))        "

## 2021-12-08 VITALS
RESPIRATION RATE: 18 BRPM | WEIGHT: 249.12 LBS | OXYGEN SATURATION: 99 % | HEART RATE: 58 BPM | SYSTOLIC BLOOD PRESSURE: 103 MMHG | HEIGHT: 65 IN | BODY MASS INDEX: 41.51 KG/M2 | DIASTOLIC BLOOD PRESSURE: 64 MMHG | TEMPERATURE: 97.7 F

## 2021-12-08 LAB
ANION GAP SERPL CALCULATED.3IONS-SCNC: 4 MMOL/L (ref 3–14)
BUN SERPL-MCNC: 17 MG/DL (ref 7–30)
CALCIUM SERPL-MCNC: 8.9 MG/DL (ref 8.5–10.1)
CHLORIDE BLD-SCNC: 109 MMOL/L (ref 94–109)
CO2 SERPL-SCNC: 26 MMOL/L (ref 20–32)
CREAT SERPL-MCNC: 0.59 MG/DL (ref 0.52–1.04)
CRP SERPL-MCNC: 8.8 MG/L (ref 0–8)
D DIMER PPP FEU-MCNC: 0.39 UG/ML FEU (ref 0–0.5)
ERYTHROCYTE [DISTWIDTH] IN BLOOD BY AUTOMATED COUNT: 12.7 % (ref 10–15)
FIBRINOGEN PPP-MCNC: 480 MG/DL (ref 170–490)
GFR SERPL CREATININE-BSD FRML MDRD: >90 ML/MIN/1.73M2
GLUCOSE BLD-MCNC: 124 MG/DL (ref 70–99)
HCT VFR BLD AUTO: 37.3 % (ref 35–47)
HGB BLD-MCNC: 12.2 G/DL (ref 11.7–15.7)
MCH RBC QN AUTO: 28.4 PG (ref 26.5–33)
MCHC RBC AUTO-ENTMCNC: 32.7 G/DL (ref 31.5–36.5)
MCV RBC AUTO: 87 FL (ref 78–100)
PLATELET # BLD AUTO: 546 10E3/UL (ref 150–450)
POTASSIUM BLD-SCNC: 4.2 MMOL/L (ref 3.4–5.3)
RBC # BLD AUTO: 4.29 10E6/UL (ref 3.8–5.2)
SODIUM SERPL-SCNC: 139 MMOL/L (ref 133–144)
WBC # BLD AUTO: 17.5 10E3/UL (ref 4–11)

## 2021-12-08 PROCEDURE — 86140 C-REACTIVE PROTEIN: CPT | Performed by: INTERNAL MEDICINE

## 2021-12-08 PROCEDURE — 85384 FIBRINOGEN ACTIVITY: CPT | Performed by: INTERNAL MEDICINE

## 2021-12-08 PROCEDURE — 85379 FIBRIN DEGRADATION QUANT: CPT | Performed by: INTERNAL MEDICINE

## 2021-12-08 PROCEDURE — 250N000011 HC RX IP 250 OP 636: Performed by: INTERNAL MEDICINE

## 2021-12-08 PROCEDURE — 80048 BASIC METABOLIC PNL TOTAL CA: CPT | Performed by: INTERNAL MEDICINE

## 2021-12-08 PROCEDURE — 85027 COMPLETE CBC AUTOMATED: CPT | Performed by: INTERNAL MEDICINE

## 2021-12-08 PROCEDURE — 250N000013 HC RX MED GY IP 250 OP 250 PS 637: Performed by: INTERNAL MEDICINE

## 2021-12-08 PROCEDURE — 99238 HOSP IP/OBS DSCHRG MGMT 30/<: CPT | Performed by: INTERNAL MEDICINE

## 2021-12-08 PROCEDURE — 36415 COLL VENOUS BLD VENIPUNCTURE: CPT | Performed by: INTERNAL MEDICINE

## 2021-12-08 RX ORDER — DEXAMETHASONE 6 MG/1
6 TABLET ORAL DAILY
Qty: 3 TABLET | Refills: 0 | Status: SHIPPED | OUTPATIENT
Start: 2021-12-08 | End: 2021-12-11

## 2021-12-08 RX ADMIN — BARICITINIB 4 MG: 2 TABLET, FILM COATED ORAL at 09:14

## 2021-12-08 RX ADMIN — ENOXAPARIN SODIUM 40 MG: 40 INJECTION SUBCUTANEOUS at 09:14

## 2021-12-08 ASSESSMENT — ACTIVITIES OF DAILY LIVING (ADL)
ADLS_ACUITY_SCORE: 6

## 2021-12-08 NOTE — PROGRESS NOTES
Patient has been assessed for Home Oxygen needs. Oxygen readings:    *Pulse oximetry (SpO2) = 96% on room air at rest while awake.    *SpO2 improved to 96% on 0liters/minute at rest.    *SpO2 = 91% on room air during activity/with exercise.    *SpO2 improved to 96% on 0liters/minute during activity/with exercise.

## 2021-12-08 NOTE — DISCHARGE SUMMARY
[facility]    Discharge Summary  Hospitalist    Date of Admission:  12/1/2021  Date of Discharge:  12/8/2021  Discharging Provider: Richy Keith MD    Discharge Diagnoses   Covid 19 pneumonia with hypxia    History of Present Illness   Kim Guillory is a 36 year old unvaccinated female with no other significant past medical history admitted on 12/1/2021 with COVID-19 pneumonia  As per her she started having headache, burning eye pain with cough, loss of taste and smell, nausea, vomiting, diarrhea around last Thursday.  Had positive test on Saturday and has had fevers.  Last night she presented to the ED due to inability to eat and fevers.  She had CT chest to rule out PE (no PE but groundglass opacities consistent with COVID-19 pneumonia) .  She was discharged home with albuterol and doxycycline  She presented again in the afternoon with worsening shortness of breath and desaturating with minimal exertion.      She continues to have nausea, vomiting, diarrhea.  Had 5 episodes of this morning.  Worsening shortness of breath  Denies any chest pain/palpitations       Hospital Course   Kim Guillory was admitted on 12/1/2021.  The following problems were addressed during her hospitalization:    Principal Problem:    Infection due to 2019 novel coronavirus  Active Problems:    Hypoxia    Kim Guillory is a 36 year old unvaccinated female with no other significant past medical history admitted on 12/1/2021 with COVID-19 pneumonia     COVID-19 Diagnosis Details:  Onset of COVID symptoms 5 days ago   Date of positive test results 12/1, symptoms started 1 week prior to the positive test result.   Vaccine status Unvaccinated, contemplative. Plans to be vaccinated as soon as cleared.       # Confirmed COVID-19 infection    # Acute Hypoxic Respiratory Failure secondary to COVID-19 infection, improving  Bilateral pneumonia 2/2 COVID-19  - Initial chest CT without contrast showed  multifocal airspace disease.   - Admit to medical floor with continuous pulse oximetry, COVID-19 special precautions, minimized lab draws, and care consolidation  - Oxygen: Her oxygen needs went up from 12/3-12/4, she was on 5 L on admission, then oxygen needs went up to high flow 45 L/min, was able to wean her oxygen back down to 5 L now, today patient is resting at 1 L, discussed about going home on oxygen versus rechecking tomorrow and possibly going home without oxygen.  Please do ambulatory pulse oximetry, either way patient can be discharged tomorrow.  - Labs: Covid labs reviewed, inflammatory markers are trending down but slowly, mild elevation in white count noted, possibly secondary to steroids.  - Imaging:  Repeat CT chest did not indicate any PE.  - Breathing treatments: albuterol inhaler four times a day scheduled and PRN; avoid nebulizers in favor of MDIs   - Treatments: Dexamethasone 6 mg x 10 days or until hospital discharge, started on 12/1 and Remdesivir x 5 days or until hospital discharge, started on 12/1, due to her increased oxygen needs, discussed about baricitinib to the patient, she agreed to proceed with treatment, started 12/4, continue till discharge.  She has finished remdesivir. Treated with course of baricitinib until discharge.  continued dexamethasone to finish 10-day course. 3 days of dosing at home  -pt doing well at time of discharge. No viral sxs. Only mild osuna with walking.   Sat 95% RA rest, 91% RA walking   - pcp follow up 1 week  - educated on potential long covid sxs  - pt to get vaccine. Follow up with pcp.   -isolation through 12/14/21.   -  lovenox 40mg daily while in hospital  - Discharge Anticoagulation: pt ambulatory and not meet criteria for home lovenox or anticoagulant for prophylaxis.       Class III obesity  - BMI 41.46  - increased risk of all cause mortality      Diet: Combination Diet Regular Diet Adult    DVT Prophylaxis: Enoxaparin (Lovenox) SQ  Miguel  Catheter: Not present  Central Lines: None  Code Status: Full Code       Disposition Plan- discharge home.       Richy Keith MD, MD    Significant Results and Procedures   See hospital course    Pending Results   none  Unresulted Labs Ordered in the Past 30 Days of this Admission     No orders found from 11/1/2021 to 12/2/2021.          Code Status   Full Code       Primary Care Physician   Physician No Ref-Primary    Physical Exam   Temp: 97.7  F (36.5  C) Temp src: Oral BP: 103/64 Pulse: 58   Resp: 18 SpO2: 99 % O2 Device: None (Room air) Oxygen Delivery: 1 LPM  Vitals:    12/01/21 1804 12/02/21 0103   Weight: 104.3 kg (230 lb) 113 kg (249 lb 1.9 oz)     Vital Signs with Ranges  Temp:  [97.3  F (36.3  C)-98.8  F (37.1  C)] 97.7  F (36.5  C)  Pulse:  [52-73] 58  Resp:  [16-18] 18  BP: ()/(49-64) 103/64  SpO2:  [98 %-99 %] 99 %  I/O last 3 completed shifts:  In: 400 [P.O.:400]  Out: -     Constitutional: nad, appears well  Respiratory: CTAB, breathing easily   Cardiovascular: RRR no r/g/m  GI: soft, nt, nd  Skin: no calf tenderness or leg swelling,., no rash  Musculoskeletal: no focal jt swelling or redness  Neurologic: nl speech and mentation  Neuropsychiatric: nl affect    Discharge Disposition   Discharged to home  Condition at discharge: Good    Consultations This Hospital Stay   None    Time Spent on this Encounter   I, Richy Keith MD, personally saw the patient today and spent less than or equal to 30 minutes discharging this patient.    Discharge Orders      Reason for your hospital stay    Covid 19 pneumonia with low oxygen levels. Oxygen levels now in goal range not requiring oxygen. 95% Room air rest and 91% room air walking     Follow-up and recommended labs and tests     1. Follow up with primary care doctor 1 week (virtual if still in isolation), in person if out of isolation, discuss timing of covid vaccine     Activity    Your activity upon discharge: activity as tolerated  Be  active, up walking hourly during day to prevent blood clots. Covid infections put you at risk for blood clots but since you are currently walking easily, you do not meet criteria for blood thinners to prevent blood clots     Discharge Instructions    1. Strongly recommend covid vaccine to protect you and your family and friends  2. Need to be in isolation for 14 days from time of diagnosis. Last day of isolation 12/14/21. First day out of isolation is 12/15/21.     Full Code     Diet    Follow this diet upon discharge: Orders Placed This Encounter      Combination Diet Regular Diet Adult     Discharge Medications   Discharge Medication List as of 12/8/2021 11:12 AM      START taking these medications    Details   dexamethasone (DECADRON) 6 MG tablet Take 1 tablet (6 mg) by mouth daily for 3 days, Disp-3 tablet, R-0, E-Prescribe         CONTINUE these medications which have NOT CHANGED    Details   albuterol (PROAIR HFA/PROVENTIL HFA/VENTOLIN HFA) 108 (90 Base) MCG/ACT inhaler Inhale 2 puffs into the lungs every 6 hours as needed for shortness of breath / dyspnea or wheezing, Disp-6.7 g, R-0, Local Print         STOP taking these medications       doxycycline hyclate (VIBRAMYCIN) 100 MG capsule Comments:   Reason for Stopping:             Allergies   No Known Allergies  Data   Most Recent 3 CBC's:Recent Labs   Lab Test 12/08/21  0900 12/07/21  1117 12/06/21  0848   WBC 17.5* 15.9* 12.2*   HGB 12.2 12.5 11.9   MCV 87 87 88   * 453* 433      Most Recent 3 BMP's:  Recent Labs   Lab Test 12/08/21  0900 12/07/21  1117 12/06/21  0848    138 138   POTASSIUM 4.2 4.0 4.2   CHLORIDE 109 106 106   CO2 26 25 29   BUN 17 17 16   CR 0.59 0.66 0.60   ANIONGAP 4 7 3   ANASTACIA 8.9 8.9 8.9   * 184* 124*     Most Recent 2 LFT's:  Recent Labs   Lab Test 12/01/21  1917 12/01/21  0038   AST 21 22   ALT 22 25   ALKPHOS 40 42   BILITOTAL 0.4 0.4     Most Recent INR's and Anticoagulation Dosing History:  Anticoagulation Dose  History    There is no flowsheet data to display.       Most Recent 3 Troponin's:  Recent Labs   Lab Test 12/01/21  0038   TROPONIN <0.015     Most Recent Cholesterol Panel:No lab results found.  Most Recent 6 Bacteria Isolates From Any Culture (See EPIC Reports for Culture Details):No lab results found.  Most Recent TSH, T4 and A1c Labs:No lab results found.  Results for orders placed or performed during the hospital encounter of 12/01/21   CT Chest Pulmonary Embolism w Contrast    Narrative    EXAM: CT CHEST PULMONARY EMBOLISM W CONTRAST  LOCATION: [facility]  DATE/TIME: 12/1/2021 3:35 AM    INDICATION: PE suspected, low/intermediate prob, positive D-dimer. COVID 19.  COMPARISON: None.  TECHNIQUE: CT chest pulmonary angiogram during arterial phase injection of IV contrast. Multiplanar reformats and MIP reconstructions were performed. Dose reduction techniques were used.   CONTRAST: 79mL Isovue-[id number]    FINDINGS:  ANGIOGRAM CHEST: Pulmonary arteries are normal caliber and negative for pulmonary emboli. Thoracic aorta is negative for dissection. No CT evidence of right heart strain.    LUNGS AND PLEURA: Multifocal, bilateral infiltrates with subpleural predominance.    MEDIASTINUM/AXILLAE: Subcentimeter mediastinal lymph nodes. No pericardial effusion. Moderate hiatal hernia.    CORONARY ARTERY CALCIFICATION: None visualized.    UPPER ABDOMEN: Normal.    MUSCULOSKELETAL: Normal.      Impression    IMPRESSION:  1.  Multifocal, bilateral infiltrates which can be seen with COVID pneumonia.  2.  No pulmonary embolism.

## 2021-12-08 NOTE — PLAN OF CARE
Summary: COVID+   DATE & TIME: 12/7/2021 8397-1910  Cognitive Concerns/ Orientation: Alert and oriented x 4   BEHAVIOR & AGGRESSION TOOL COLOR: Green  CIWA SCORE: N/A   ABNL VS/O2: VSS, currently on 1L NC sating 96%, desat to mid 80% briefly on RA with activity, recovers >90% in less than 1 min at rest on 1L. pt reported feeling mild SOB with activity, overall feeling better compared to last few days.   MOBILITY: Independent  PAIN MANAGMENT: Denies  DIET: Regular-good appetite  BOWEL/BLADDER: Continent  ABNL LAB/BG: CRP 12.9, Fibrinogen 546, D-dimer 1.39, WBC 15.9  DRAIN/DEVICES: Peripheral IV SL right arm   TELEMETRY RHYTHM: N/A  SKIN: wnl   TESTS/PROCEDURES: N/A  D/C DAY/GOALS/PLACE: Possible discharge home tomorrow once resp status improved.   OTHER IMPORTANT INFO: Started baricitinib 12/4, completed Remdesivir. On decadron and lovenox. Productive cough with thin secretions.

## 2021-12-08 NOTE — PLAN OF CARE
Summary: COVID+   DATE & TIME: 12/08/21, 7556-3980  Cognitive Concerns/ Orientation: Alert and oriented x 4   BEHAVIOR & AGGRESSION TOOL COLOR: Green  CIWA SCORE: N/A   ABNL VS/O2: Bradycardic at times, other VSS, currently on 1L NC sating 98%   MOBILITY: Independent  PAIN MANAGMENT: Complains of mild headache, declined interventions   DIET: Regular-good appetite  BOWEL/BLADDER: Continent,   ABNL LAB/BG: CRP 12.9, Fibrinogen 546, D-dimer 1.38, WBC 15.9  DRAIN/DEVICES: Peripheral IV SL right arm   TELEMETRY RHYTHM: N/A  SKIN: WNL  TESTS/PROCEDURES: N/A  D/C DAY/GOALS/PLACE: Possible discharge home today once resp status improved.   OTHER IMPORTANT INFO: Started baricitinib 12/4, completed Remdesivir. On decadron and lovenox. Productive cough with thin secretions. Diminished lung sounds, IS use encouraged.

## 2021-12-08 NOTE — PLAN OF CARE
Discharge    Patient discharged to home via w/c with family  Care plan note  Summary: COVID+   DATE & TIME: 12/8/2021 1430-6704  Cognitive Concerns/ Orientation: Alert and oriented x 4   BEHAVIOR & AGGRESSION TOOL COLOR: Green  CIWA SCORE: N/A   ABNL VS/O2: HR 58, other VSS, O2 96% RA at rest, desat to 90-91% with activity on RA. Mildly QUILES. Denies chest pain   MOBILITY: Independent  PAIN MANAGMENT: Denies  DIET: Regular-good appetite  BOWEL/BLADDER: Continent  ABNL LAB/BG: CRP 8.8 improving  DRAIN/DEVICES: Peripheral IV access x1 removed prior to discharge   TELEMETRY RHYTHM: N/A  SKIN: wnl   TESTS/PROCEDURES: N/A  D/C DAY/GOALS/PLACE: Discharged home today at 1130, family provided transportation.   OTHER IMPORTANT INFO: Went through AVS and discharge home medication information, pt verbalized understanding. Belongings packed and sent with the pt.     Listed belongings gathered and given to patient (including from security/pharmacy). Yes  Care Plan and Patient education resolved: Yes  Prescriptions if needed, hard copies sent with patient  NA  Medication Bin checked and emptied on discharge Yes  SW/care coordinator/charge RN aware of discharge: Pt reported she can make her own PCP follow up appointment.